# Patient Record
Sex: FEMALE | Race: BLACK OR AFRICAN AMERICAN | NOT HISPANIC OR LATINO | Employment: UNEMPLOYED | URBAN - METROPOLITAN AREA
[De-identification: names, ages, dates, MRNs, and addresses within clinical notes are randomized per-mention and may not be internally consistent; named-entity substitution may affect disease eponyms.]

---
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
## 2018-06-22 ENCOUNTER — OFFICE VISIT (OUTPATIENT)
Dept: OPHTHALMOLOGY | Facility: CLINIC | Age: 18
End: 2018-06-22
Payer: COMMERCIAL

## 2018-06-22 DIAGNOSIS — H02.125 MECHANICAL ECTROPION OF LOWER EYELIDS OF BOTH EYES: ICD-10-CM

## 2018-06-22 DIAGNOSIS — H52.13 MYOPIA OF BOTH EYES: Primary | ICD-10-CM

## 2018-06-22 DIAGNOSIS — H02.122 MECHANICAL ECTROPION OF LOWER EYELIDS OF BOTH EYES: ICD-10-CM

## 2018-06-22 ASSESSMENT — TONOMETRY
OD_IOP_MMHG: 15
OS_IOP_MMHG: 12
IOP_METHOD: ICARE

## 2018-06-22 ASSESSMENT — CONF VISUAL FIELD
OS_NORMAL: 1
OD_NORMAL: 1
METHOD: COUNTING FINGERS

## 2018-06-22 ASSESSMENT — VISUAL ACUITY
METHOD: SNELLEN - LINEAR
OS_PH_SC: 20/30+2
OD_SC: 20/70
OS_SC: 20/40
OS_SC+: -2
OD_SC+: -
OD_PH_SC: 20/30

## 2018-06-22 ASSESSMENT — REFRACTION_MANIFEST
OD_AXIS: 087
OS_CYLINDER: +0.50
OD_CYLINDER: +0.75
OS_AXIS: 087
OS_SPHERE: -1.50
OD_SPHERE: -2.00

## 2018-06-22 ASSESSMENT — EXTERNAL EXAM - RIGHT EYE: OD_EXAM: NORMAL

## 2018-06-22 ASSESSMENT — EXTERNAL EXAM - LEFT EYE: OS_EXAM: NORMAL

## 2018-06-22 ASSESSMENT — CUP TO DISC RATIO
OD_RATIO: 0.5
OS_RATIO: 0.5

## 2018-06-22 NOTE — MR AVS SNAPSHOT
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      Who to contact     Please call your clinic at [phone] to:    Ask questions about your health    Make or cancel appointments    Discuss your medicines    Learn about your test results    Speak to your doctor            Additional Information About Your Visit        MyChart Information     Dizzion is an electronic gateway that provides easy, online access to your medical records. With Dizzion, you can request a clinic appointment, read your test results, renew a prescription or communicate with your care team.     To sign up for [facility] visit the website at [email or web address]   You will be asked to enter the access code listed below, as well as some personal information. Please follow the directions to create your username and password.     Your access code is: [email or web address]  Expires: 2018  6:31 AM     Your access code will  in 90 days. If you need help or a new code, please contact your [facility] or call [phone] for assistance.        Care EveryWhere ID     This is your Care EveryWhere ID. This could be used by other organizations to access your [place] medical records  [id number]         Blood Pressure from Last 3 Encounters:   No data found for BP    Weight from Last 3 Encounters:   No data found for Wt              We Performed the Following     REFRACTION [[id number]]        Primary Care Provider Office Phone # Fax #     Clinic [facility] [phone]       [address] [facility] [address]        Equal Access to Services     HELEN JARA : Hadii aad ku hadmikaylajeff Mohini, homeroda parishportilloha, martha kajonda evelyn, sarah black laminnabrii marcela. So [facility] [phone].    ATENCIÓN: Si habla español, tiene a knutson disposición servicios gratuitos de asistencia lingüística. Llame al [phone].    We comply with applicable federal civil rights laws and [place] laws. We do not discriminate on the basis of race, color, national origin, age, disability, sex, sexual orientation, or gender identity.            Thank you!     Thank you for choosing [facility]  for your care. Our goal is always to provide you with excellent care. Hearing back from our patients is one way we can continue to improve our services. Please take a few minutes to complete the written survey that you may receive in the mail after your visit with us. Thank you!             Your Updated Medication List - Protect others around you: Learn how to safely use, store and throw away your medicines at www.disposemymeds.org.      Notice  As of 6/22/2018 11:59 PM    You have not been prescribed any medications.

## 2018-06-22 NOTE — PROGRESS NOTES
Assessment/Plan  (H52.13) Myopia of both eyes  (primary encounter diagnosis)  Comment: Myopia OU  Plan: REFRACTION [[id number]]         Educated patient on condition and clinical findings. Dispensed spectacle prescription for full time wear. Educated patient on possibility of adaptation period, if symptoms do not improve return to clinic for further testing.    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 Monitor annually.    Return to clinic in 1 year for comprehensive eye exam.    Complete documentation of historical and exam elements from today's encounter can  be found in the full encounter summary report (not reduplicated in this progress  note). I personally obtained the chief complaint(s) and history of present illness. I  confirmed and edited as necessary the review of systems, past medical/surgical  history, family history, social history, and examination findings as documented by  others; and I examined the patient myself. I personally reviewed the relevant tests,  images, and reports as documented above. I formulated and edited as necessary the  assessment and plan and discussed the findings and management plan with the  patient and family.    Filiberto Ruiz, OD, FAAO

## 2018-06-22 NOTE — NURSING NOTE
Chief Complaints and History of Present Illnesses   Patient presents with     Annual Eye Exam     HPI    Affected eye(s):  Both   Symptoms:     Blurred vision   No difficulty with reading   Redness      Frequency:  Constant       Do you have eye pain now?:  No      Comments:  Vision is blurry at distance. Had glasses in the past, lost 3 months ago. Had some pain and tears with glasses. Sometimes having pain and redness that comes and goes. Has uesd allergy eye drops in the past but not for 3 years.    Anju Forman COT 1:59 PM June 22, 2018

## 2023-04-04 ENCOUNTER — PRENATAL OFFICE VISIT (OUTPATIENT)
Dept: NURSING | Facility: CLINIC | Age: 23
End: 2023-04-04

## 2023-04-04 VITALS — HEIGHT: 65 IN

## 2023-04-04 DIAGNOSIS — Z34.90 ENCOUNTER FOR SUPERVISION OF NORMAL PREGNANCY: ICD-10-CM

## 2023-04-04 DIAGNOSIS — Z23 NEED FOR TDAP VACCINATION: ICD-10-CM

## 2023-04-04 DIAGNOSIS — Z98.891 HISTORY OF CESAREAN SECTION: ICD-10-CM

## 2023-04-04 PROBLEM — F32.9 MAJOR DEPRESSIVE DISORDER, SINGLE EPISODE: Status: ACTIVE | Noted: 2018-01-03

## 2023-04-04 PROBLEM — Z86.2 HISTORY OF THROMBOCYTOPENIA: Status: ACTIVE | Noted: 2022-10-19

## 2023-04-04 PROBLEM — D25.9 UTERINE LEIOMYOMA: Status: ACTIVE | Noted: 2022-10-19

## 2023-04-04 PROBLEM — Z87.59: Status: ACTIVE | Noted: 2022-10-19

## 2023-04-04 PROCEDURE — 99207 PR NO CHARGE NURSE ONLY: CPT

## 2023-04-04 RX ORDER — CETIRIZINE HYDROCHLORIDE 10 MG/1
TABLET ORAL
COMMUNITY
End: 2023-04-04

## 2023-04-04 RX ORDER — AMOXICILLIN 250 MG
CAPSULE ORAL
COMMUNITY
Start: 2022-10-22 | End: 2023-04-04

## 2023-04-04 RX ORDER — DIPHENHYDRAMINE HCL 25 MG
CAPSULE ORAL
COMMUNITY
End: 2023-04-04

## 2023-04-04 RX ORDER — B-COMPLEX WITH VITAMIN C
1 TABLET ORAL
COMMUNITY
Start: 2022-04-12 | End: 2023-04-04

## 2023-04-04 RX ORDER — VALACYCLOVIR HYDROCHLORIDE 500 MG/1
TABLET, FILM COATED ORAL
COMMUNITY
Start: 2022-10-22 | End: 2023-04-04

## 2023-04-04 RX ORDER — POLYETHYLENE GLYCOL 3350 17 G/17G
17 POWDER, FOR SOLUTION ORAL
COMMUNITY
Start: 2022-10-22 | End: 2023-04-04

## 2023-04-04 RX ORDER — ONDANSETRON 4 MG/1
TABLET, FILM COATED ORAL
COMMUNITY
End: 2023-04-04

## 2023-04-04 RX ORDER — MUPIROCIN 20 MG/G
OINTMENT TOPICAL
COMMUNITY
End: 2023-04-04

## 2023-04-04 RX ORDER — ERGOCALCIFEROL 1.25 MG/1
CAPSULE ORAL
COMMUNITY
End: 2023-04-04

## 2023-04-04 RX ORDER — CYPROHEPTADINE HYDROCHLORIDE 4 MG/1
TABLET ORAL
COMMUNITY
End: 2023-04-04

## 2023-04-04 RX ORDER — ACETAMINOPHEN 500 MG
TABLET ORAL
COMMUNITY
Start: 2022-11-07 | End: 2023-07-11

## 2023-04-04 RX ORDER — LORATADINE 10 MG/1
TABLET ORAL
COMMUNITY
End: 2023-04-04

## 2023-04-04 RX ORDER — FLUTICASONE PROPIONATE 50 MCG
SPRAY, SUSPENSION (ML) NASAL
COMMUNITY
End: 2023-04-04

## 2023-04-04 NOTE — PROGRESS NOTES
Important Information for Provider:     New ob nurse intake by phone, second pregnancy. History of C section 10/19/2022, post dates( [facility]). Patient had only 1 period after previous delivery, LMP 1/10/2023. Heavy period with mucous. Positive pregnancy test 2/10/2023.. ultrasound scheduled for 4/13/2023 with CNM to call with results. NOB with CNM 4/18/2023      Prenatal OB Questionnaire  Patient supplied answers from flow sheet for:  Prenatal OB Questionnaire.  Past Medical History  Have you ever received care for your mental health? : No  Have you ever been in a major accident or suffered serious trauma?: No  Within the last year, has anyone hit, slapped, kicked or otherwise hurt you?: No  In the last year, has anyone forced you to have sex when you didn't want to?: No    Past Medical History 2   Have you ever received a blood transfusion?: No  Would you accept a blood transfusion if was medically recommended?: Yes  Does anyone in your home smoke?: No   Is your blood type Rh negative?: Unknown  Have you ever ?: (!) Yes  Have you been hospitalized for a nonsurgical reason excluding normal delivery?: No  Have you ever had an abnormal pap smear?: No    Past Medical History (Continued)  Do you have a history of abnormalities of the uterus?: No  Did your mother take EMILE or any other hormones when she was pregnant with you?: No  Do you have any other problems we have not asked about which you feel may be important to this pregnancy?: No                     Allergies as of 4/4/2023:    Allergies as of 04/04/2023     (No Known Allergies)       Current medications are:  Current Outpatient Medications   Medication Sig Dispense Refill     acetaminophen (TYLENOL) 500 MG tablet acetaminophen 500 mg tablet (Patient not taking: Reported on 4/4/2023)           Early ultrasound screening tool:    Does patient have irregular periods?  No  Did patient use hormonal birth control in the three months prior to positive urine  pregnancy test? No  Is the patient breastfeeding?  No  Is the patient 10 weeks or greater at time of education visit?  Yes

## 2023-04-13 ENCOUNTER — VIRTUAL VISIT (OUTPATIENT)
Dept: MIDWIFE SERVICES | Facility: CLINIC | Age: 23
End: 2023-04-13
Payer: COMMERCIAL

## 2023-04-13 ENCOUNTER — HOSPITAL ENCOUNTER (OUTPATIENT)
Dept: ULTRASOUND IMAGING | Facility: CLINIC | Age: 23
Discharge: HOME | End: 2023-04-13
Attending: OBSTETRICS & GYNECOLOGY | Admitting: OBSTETRICS & GYNECOLOGY
Payer: COMMERCIAL

## 2023-04-13 DIAGNOSIS — Z34.90 ENCOUNTER FOR SUPERVISION OF NORMAL PREGNANCY: ICD-10-CM

## 2023-04-13 DIAGNOSIS — Z34.90 INTRAUTERINE PREGNANCY: Primary | ICD-10-CM

## 2023-04-13 PROCEDURE — 76801 OB US < 14 WKS SINGLE FETUS: CPT | Mod: 26 | Performed by: RADIOLOGY

## 2023-04-13 PROCEDURE — 76801 OB US < 14 WKS SINGLE FETUS: CPT

## 2023-04-13 PROCEDURE — 99207 PR NO BILLABLE SERVICE THIS VISIT: CPT | Mod: 93 | Performed by: ADVANCED PRACTICE MIDWIFE

## 2023-04-13 NOTE — PROGRESS NOTES
Phone-Visit Details    Type of service:  Phone Visit    Originating Location (pt. Location): Home    Distant Location (provider location): On-site    Mode of Communication: Telephone visit    Telephone time: 1217    Telephone call to [facility] [place] to discuss ultrasound results. LMP is 1/10/23 which aligns with NICOLETTE of 10/17/23. Ultrasound from 4/13/23 shows gage viable IUP dated 13w2d which aligns with LMP dating. Plan to keep due date as 10/17/23. RTC for NOB visit.    6 minutes spent on phone with patient.    CAMACHO Manning CNM

## 2023-05-08 LAB
ABO/RH(D): NORMAL
ANTIBODY SCREEN: NEGATIVE
SPECIMEN EXPIRATION DATE: NORMAL

## 2023-05-09 ENCOUNTER — PRENATAL OFFICE VISIT (OUTPATIENT)
Dept: MIDWIFE SERVICES | Facility: CLINIC | Age: 23
End: 2023-05-09
Payer: COMMERCIAL

## 2023-05-09 ENCOUNTER — TRANSCRIBE ORDERS (OUTPATIENT)
Dept: MATERNAL FETAL MEDICINE | Facility: CLINIC | Age: 23
End: 2023-05-09

## 2023-05-09 VITALS
BODY MASS INDEX: 24.57 KG/M2 | HEIGHT: 65 IN | HEART RATE: 84 BPM | WEIGHT: 147.5 LBS | DIASTOLIC BLOOD PRESSURE: 75 MMHG | SYSTOLIC BLOOD PRESSURE: 121 MMHG

## 2023-05-09 DIAGNOSIS — Z3A.17 17 WEEKS GESTATION OF PREGNANCY: ICD-10-CM

## 2023-05-09 DIAGNOSIS — Z98.891 HISTORY OF C-SECTION: ICD-10-CM

## 2023-05-09 DIAGNOSIS — O09.892 SHORT INTERVAL BETWEEN PREGNANCIES AFFECTING PREGNANCY IN SECOND TRIMESTER, ANTEPARTUM: ICD-10-CM

## 2023-05-09 DIAGNOSIS — O26.90 PREGNANCY RELATED CONDITION, ANTEPARTUM: Primary | ICD-10-CM

## 2023-05-09 DIAGNOSIS — O09.92 HIGH-RISK PREGNANCY, SECOND TRIMESTER: Primary | ICD-10-CM

## 2023-05-09 LAB
ALBUMIN UR-MCNC: NEGATIVE MG/DL
APPEARANCE UR: CLEAR
BILIRUB UR QL STRIP: NEGATIVE
COLOR UR AUTO: YELLOW
ERYTHROCYTE [DISTWIDTH] IN BLOOD BY AUTOMATED COUNT: 14.3 % (ref 10–15)
GLUCOSE UR STRIP-MCNC: NEGATIVE MG/DL
HCT VFR BLD AUTO: 34.7 % (ref 35–47)
HGB BLD-MCNC: 11.8 G/DL (ref 11.7–15.7)
HGB UR QL STRIP: NEGATIVE
KETONES UR STRIP-MCNC: NEGATIVE MG/DL
LEUKOCYTE ESTERASE UR QL STRIP: NEGATIVE
MCH RBC QN AUTO: 29.7 PG (ref 26.5–33)
MCHC RBC AUTO-ENTMCNC: 34 G/DL (ref 31.5–36.5)
MCV RBC AUTO: 87 FL (ref 78–100)
NITRATE UR QL: NEGATIVE
PH UR STRIP: 6.5 [PH] (ref 5–7)
PLATELET # BLD AUTO: 160 10E3/UL (ref 150–450)
RBC # BLD AUTO: 3.97 10E6/UL (ref 3.8–5.2)
SP GR UR STRIP: <=1.005 (ref 1–1.03)
UROBILINOGEN UR STRIP-ACNC: 0.2 E.U./DL
WBC # BLD AUTO: 7.6 10E3/UL (ref 4–11)

## 2023-05-09 PROCEDURE — 87086 URINE CULTURE/COLONY COUNT: CPT | Performed by: ADVANCED PRACTICE MIDWIFE

## 2023-05-09 PROCEDURE — 81003 URINALYSIS AUTO W/O SCOPE: CPT | Performed by: ADVANCED PRACTICE MIDWIFE

## 2023-05-09 PROCEDURE — 85027 COMPLETE CBC AUTOMATED: CPT | Performed by: ADVANCED PRACTICE MIDWIFE

## 2023-05-09 PROCEDURE — 87340 HEPATITIS B SURFACE AG IA: CPT | Performed by: ADVANCED PRACTICE MIDWIFE

## 2023-05-09 PROCEDURE — 99207 PR FIRST OB VISIT: CPT | Performed by: ADVANCED PRACTICE MIDWIFE

## 2023-05-09 PROCEDURE — 36415 COLL VENOUS BLD VENIPUNCTURE: CPT | Performed by: ADVANCED PRACTICE MIDWIFE

## 2023-05-09 PROCEDURE — 86901 BLOOD TYPING SEROLOGIC RH(D): CPT | Performed by: ADVANCED PRACTICE MIDWIFE

## 2023-05-09 PROCEDURE — 86850 RBC ANTIBODY SCREEN: CPT | Performed by: ADVANCED PRACTICE MIDWIFE

## 2023-05-09 PROCEDURE — 86762 RUBELLA ANTIBODY: CPT | Performed by: ADVANCED PRACTICE MIDWIFE

## 2023-05-09 PROCEDURE — 86803 HEPATITIS C AB TEST: CPT | Performed by: ADVANCED PRACTICE MIDWIFE

## 2023-05-09 PROCEDURE — 86780 TREPONEMA PALLIDUM: CPT | Performed by: ADVANCED PRACTICE MIDWIFE

## 2023-05-09 PROCEDURE — 87389 HIV-1 AG W/HIV-1&-2 AB AG IA: CPT | Performed by: ADVANCED PRACTICE MIDWIFE

## 2023-05-09 PROCEDURE — 86900 BLOOD TYPING SEROLOGIC ABO: CPT | Performed by: ADVANCED PRACTICE MIDWIFE

## 2023-05-09 RX ORDER — PRENATAL VIT/IRON FUM/FOLIC AC 27MG-0.8MG
1 TABLET ORAL DAILY
Qty: 90 TABLET | Refills: 3 | Status: SHIPPED | OUTPATIENT
Start: 2023-05-09 | End: 2023-10-25

## 2023-05-09 NOTE — PROGRESS NOTES
17w0d   Byron Vásquez is a 23 year old  who presents to the clinic for an new ob visit. She was a previous CNM patient of the [facility] midwives. She had a C/S at [facility] on 10/19/22 due to fetal intolerance of labor. She had decelerations noted on PD testing, and attempted induction of labor. Fetus did not tolerate, so C/S was done. She was hoping for TOLAC for this pregnancy, but pregnancies are too closely spaced. No complications with previous pregnancy other than C/S. She has had 1 covid-19 vaccine, is interested in another. Had NIL PAP postpartum at [facility] in 2022.  Estimated Date of Delivery: Oct 17, 2023 is calculated from Patient's last menstrual period was 01/10/2023.     She has not had bleeding since her LMP.   She has had mild nausea. Weigh loss has not occurred.   This was not a planned pregnancy, was taking OCPs.   FOB is involved,  Stefan   OTHER CONCERNS: C/S in Oct, 2022    INFECTION HISTORY  HIV: no  Hepatitis B: no  Hepatitis C: no  Syphilis:  no  Tuberculosis: no   PPD- no  Herpes self: no  Herpes partner:  no  Chlamydia:  no  Gonorrhea:  no  HPV: no  BV:  no  Trichomonis:  no  Chicken Pox:  NO  ====================================================  GENETIC SCREENING  Genetic screening reviewed. High Risk? No  ====================================================  PERSONAL/SOCIAL HISTORY  Lives lives with their family.  Employment: Unemployed.    HX OF ABUSE: no  =====================================================   REVIEW OF SYSTEMS  CONSTITUTIONAL: NEGATIVE for fever, chills  EYES: NEGATIVE for vision changes   RESP: NEGATIVE for significant cough or SOB  CV: NEGATIVE for chest pain, palpitations   GI: NEGATIVE for nausea, abdominal pain, heartburn, or change in bowel habits  GI: NEGATIVE for nausea, abdominal pain, heartburn and POSITIVE for constipation  : NEGATIVE for frequency, dysuria, or hematuria  MUSCULOSKELETAL: NEGATIVE for significant arthralgias or myalgia  NEURO: NEGATIVE  "for weakness, dizziness or paresthesias or headache  PSYCHIATRIC: NEGATIVE for changes in mood or affect  ====================================================    PHYSICAL EXAM:  /75 (BP Location: Left arm, Patient Position: Sitting, Cuff Size: Adult Regular)   Pulse 84   Ht 1.651 m (5' 5\")   Wt 66.9 kg (147 lb 8 oz)   LMP 01/10/2023   BMI 24.55 kg/m    BMI- Body mass index is 24.55 kg/m .,     RECOMMENDED WEIGHT GAIN: 25-35 lbs.  PHQ9- Today's Depression Rating was No Value exists for the : HP#PHQ9  GENERAL:  Pleasant pregnant female, alert, well groomed.   SKIN:  Warm and dry, without lesions or rashes  HEAD: Symmetrical features.  NECK:  Thyroid without enlargement and nodules.  Lymph nodes not palpable.   LUNGS:  Clear to auscultation.  HEART:  RRR without murmur.  ABDOMEN: Soft without masses , tenderness or organomegaly.  No CVA tenderness. Well healed scar from  section     MUSCULOSKELETAL:  Full range of motion  EXTREMITIES:  No edema. No significant varicosities.   =========================================  ASSESSMENT:  17w0d,   (O09.92) High-risk pregnancy, second trimester  (primary encounter diagnosis)  Plan: Prenatal Vit-Fe Fumarate-FA (PRENATAL         MULTIVITAMIN W/IRON) 27-0.8 MG tablet, Mat         Fetal Med Ctr Referral - Pregnancy, US OB > 14         Weeks    (Z3A.17) 17 weeks gestation of pregnancy    (Z98.891) History of     (O09.892) Short interval between pregnancies affecting pregnancy in second trimester, antepartum  Recommend MD care for this pregnancy. Is not a candidate for TOLAC due to recent C/S delivery in Oct, 2022. Discussed with patient.    PREGNANCY AT RISK? Yes-close pregnancy interval  ==========================================  PLAN:  Instructed on use of triage nurse line and contacting the on call CNM after hours for an urgent need such as fever, vagina bleeding, bladder or vaginal infection, rupture of membranes,  or term " labor.    Discussed the indications, uses for and false positives for quad screen, nuchal translucency and fetal survey ultrasound at 18-20 weeks gestation. M order placed.  Instructed on best evidence for: weight gain for her BMI for pregnancy; healthy diet and foods to avoid; exercise and activity during pregnancy;avoiding exposure to toxoplasmosis; and maintenance of a generally healthy lifestyle.   Discussed the harms, benefits, side effects and alternative therapies for current prescribed and OTC medications.  Return to care 2-3 weeks for fetal survey and MD appt.    Cem Carlos CNM

## 2023-05-10 LAB
HBV SURFACE AG SERPL QL IA: NONREACTIVE
HCV AB SERPL QL IA: NONREACTIVE
HIV 1+2 AB+HIV1 P24 AG SERPL QL IA: NONREACTIVE
RUBV IGG SERPL QL IA: 3.27 INDEX
RUBV IGG SERPL QL IA: POSITIVE
T PALLIDUM AB SER QL: NONREACTIVE

## 2023-05-11 LAB — BACTERIA UR CULT: NORMAL

## 2023-05-17 ENCOUNTER — PRE VISIT (OUTPATIENT)
Dept: MATERNAL FETAL MEDICINE | Facility: CLINIC | Age: 23
End: 2023-05-17

## 2023-06-07 ENCOUNTER — ANCILLARY PROCEDURE (OUTPATIENT)
Dept: ULTRASOUND IMAGING | Facility: CLINIC | Age: 23
End: 2023-06-07
Attending: ADVANCED PRACTICE MIDWIFE
Payer: COMMERCIAL

## 2023-06-07 DIAGNOSIS — O09.92 HIGH-RISK PREGNANCY, SECOND TRIMESTER: ICD-10-CM

## 2023-06-07 PROCEDURE — 76805 OB US >/= 14 WKS SNGL FETUS: CPT | Performed by: OBSTETRICS & GYNECOLOGY

## 2023-07-11 ENCOUNTER — PRENATAL OFFICE VISIT (OUTPATIENT)
Dept: OBGYN | Facility: CLINIC | Age: 23
End: 2023-07-11
Payer: COMMERCIAL

## 2023-07-11 VITALS
SYSTOLIC BLOOD PRESSURE: 102 MMHG | OXYGEN SATURATION: 99 % | WEIGHT: 154.6 LBS | BODY MASS INDEX: 25.73 KG/M2 | HEART RATE: 73 BPM | DIASTOLIC BLOOD PRESSURE: 66 MMHG

## 2023-07-11 DIAGNOSIS — O09.892 SHORT INTERVAL BETWEEN PREGNANCIES AFFECTING PREGNANCY IN SECOND TRIMESTER, ANTEPARTUM: ICD-10-CM

## 2023-07-11 DIAGNOSIS — O34.219 HISTORY OF CESAREAN DELIVERY, CURRENTLY PREGNANT: ICD-10-CM

## 2023-07-11 DIAGNOSIS — Z34.82 ENCOUNTER FOR SUPERVISION OF OTHER NORMAL PREGNANCY IN SECOND TRIMESTER: Primary | ICD-10-CM

## 2023-07-11 LAB
GLUCOSE 1H P 50 G GLC PO SERPL-MCNC: 86 MG/DL (ref 70–129)
HGB BLD-MCNC: 12 G/DL (ref 11.7–15.7)
HOLD SPECIMEN: NORMAL

## 2023-07-11 PROCEDURE — 36415 COLL VENOUS BLD VENIPUNCTURE: CPT | Performed by: OBSTETRICS & GYNECOLOGY

## 2023-07-11 PROCEDURE — 99207 PR PRENATAL VISIT: CPT | Performed by: OBSTETRICS & GYNECOLOGY

## 2023-07-11 PROCEDURE — 82950 GLUCOSE TEST: CPT | Performed by: OBSTETRICS & GYNECOLOGY

## 2023-07-11 ASSESSMENT — PATIENT HEALTH QUESTIONNAIRE - PHQ9
5. POOR APPETITE OR OVEREATING: NOT AT ALL
SUM OF ALL RESPONSES TO PHQ QUESTIONS 1-9: 0

## 2023-07-11 ASSESSMENT — ANXIETY QUESTIONNAIRES
1. FEELING NERVOUS, ANXIOUS, OR ON EDGE: NOT AT ALL
IF YOU CHECKED OFF ANY PROBLEMS ON THIS QUESTIONNAIRE, HOW DIFFICULT HAVE THESE PROBLEMS MADE IT FOR YOU TO DO YOUR WORK, TAKE CARE OF THINGS AT HOME, OR GET ALONG WITH OTHER PEOPLE: NOT DIFFICULT AT ALL
6. BECOMING EASILY ANNOYED OR IRRITABLE: NOT AT ALL
5. BEING SO RESTLESS THAT IT IS HARD TO SIT STILL: NOT AT ALL
3. WORRYING TOO MUCH ABOUT DIFFERENT THINGS: NOT AT ALL
2. NOT BEING ABLE TO STOP OR CONTROL WORRYING: NOT AT ALL

## 2023-07-11 NOTE — PATIENT INSTRUCTIONS
Blood Glucose Screening During Pregnancy  Gestational diabetes is diabetes that only pregnant women get. Changes in your body during pregnancy can cause high blood sugar (glucose). This can cause problems for you and your baby. It is a serious condition. But it can be controlled.      Your healthcare provider will talk with you about blood glucose screening.     Who is at risk for gestational diabetes?  You are at risk of getting gestational diabetes if any of the risk factors below apply to you. The risk for this condition gets higher as your number of risk factors increases:     You are , , , , or .    You weigh more than your healthcare provider says is healthy for you.    You have a relative with diabetes.    You are older than 25.    You had gestational diabetes during a past pregnancy.    You had a stillbirth or a very large baby before.    You have a history of abnormal glucose tolerance.    You have sugar in your urine at the first prenatal visit.    You have metabolic syndrome, PCOS (polycystic ovary syndrome), are using glucocorticoids, or have high blood pressure.    You are pregnant with twins or more  What happens during a screening?  Here is what to expect during a blood glucose screening:    There is conflicting advice for screening. But the American College of Obstetricians and Gynecologists currently advises that all pregnant women be screened for gestational diabetes. When you are screened depends on your risk. Women are tested at 24 to 28 weeks of pregnancy. Women at high risk may be tested when they first learn they are pregnant.    To do the screening, a blood sample is taken. Your blood sugar level is measured.    If the results show a high blood sugar level, a glucose tolerance test may be ordered. You will drink a certain amount of sugar. This test measures how long it takes for sugar to leave your blood. The test will show if  you have gestational diabetes.  What to know if you test positive  Here are some things you need to know:    Gestational diabetes can be treated. The best way to control it is to find out you have it early and start treatment quickly.    This condition can cause problems for the mother during pregnancy. It can also cause problems with the baby during pregnancy, delivery, and after. Treatment greatly lowers the chance for problems.    The changes in your body that cause gestational diabetes normally happen only when you are pregnant. After the baby is born, your body goes back to normal. The condition goes away. But you may be more likely to have type 2 diabetes later. Talk with your healthcare provider about ways to help prevent type 2 diabetes.  Treating gestational diabetes  Here is how to treat gestational diabetes:    You ll need to check your blood sugar often. You can do this at home. Prick your finger and check a drop of blood on a glucose monitor. Your healthcare provider will show you how and when to check your blood sugar. They will talk about your target blood sugar level.    To manage your blood sugar, you will be given a special plan. It will likely include meal planning and getting regular exercise. Some women need to take a hormone called insulin. Others may take medicine to help control their blood sugar.  HelpMeNow last reviewed this educational content on 12/1/2022 2000-2023 [employer] StayWell [employer]. All rights reserved. This information is not intended as a substitute for professional medical care. Always follow your healthcare professional's instructions.          Tdap (Tetanus, Diphtheria, Pertussis) Vaccine: What You Need to Know    This is a Vaccine Information Statement from the CDC.   Many vaccine information statements are available in [place] and other languages. See www.immunize.org/vis   Hojas de información sobre vacunas están disponibles en español y en muchos otros idiomas. Visite  www.immunize.org/vis   1. Why get vaccinated?   Tdap vaccine can prevent tetanus, diphtheria, and pertussis.   Diphtheria and pertussis spread from person to person. Tetanus enters the body through cuts or wounds.     TETANUS (T) causes painful stiffening of the muscles. Tetanus can lead to serious health problems, including being unable to open the mouth, having trouble swallowing and breathing, or death.    DIPHTHERIA (D) can lead to difficulty breathing, heart failure, paralysis, or death.    PERTUSSIS (aP), also known as  whooping cough,  can cause uncontrollable, violent coughing that makes it hard to breathe, eat, or drink. Pertussis can be extremely serious especially in babies and young children, causing pneumonia, convulsions, brain damage, or death. In teens and adults, it can cause weight loss, loss of bladder control, passing out, and rib fractures from severe coughing.  2. Tdap vaccine   Tdap is only for children 7 years and older, adolescents, and adults.   Adolescents should receive a single dose of Tdap, preferably at age 11 or 12 years.   Pregnant people should get a dose of Tdap during every pregnancy, preferably during the early part of the third trimester, to help protect the  from pertussis. Infants are most at risk for severe, life-threatening complications from pertussis.   Adults who have never received Tdap should get a dose of Tdap.   Also, adults should receive a booster dose of either Tdap or Td (a different vaccine that protects against tetanus and diphtheria but not pertussis) every 10 years , or after 5 years in the case of a severe or dirty wound or burn.   Tdap may be given at the same time as other vaccines.   3. Talk with your health care provider  Tell your vaccination provider if the person getting the vaccine:     Has had an allergic reaction after a previous dose of any vaccine that protects against tetanus, diphtheria, or pertussis , or has any severe, life-threatening  allergies    Has had a coma, decreased level of consciousness, or prolonged seizures within 7 days after a previous dose of any pertussis vaccine (DTP, DTaP, or Tdap)    Has seizures or another nervous system problem    Has ever had Guillain-Barré Syndrome (also called  GBS )    Has had severe pain or swelling after a previous dose of any vaccine that protects against tetanus or diphtheria  In some cases, your health care provider may decide to postpone Tdap vaccination until a future visit.   People with minor illnesses, such as a cold, may be vaccinated. People who are moderately or severely ill should usually wait until they recover before getting Tdap vaccine.   Your health care provider can give you more information.  4. Risks of a vaccine reaction     Pain, redness, or swelling where the shot was given, mild fever, headache, feeling tired, and nausea, vomiting, diarrhea, or stomachache sometimes happen after Tdap vaccination.  People sometimes faint after medical procedures, including vaccination. Tell your provider if you feel dizzy or have vision changes or ringing in the ears.   As with any medicine, there is a very remote chance of a vaccine causing a severe allergic reaction, other serious injury, or death.   5. What if there is a serious problem?  An allergic reaction could occur after the vaccinated person leaves the clinic. If you see signs of a severe allergic reaction (hives, swelling of the face and throat, difficulty breathing, a fast heartbeat, dizziness, or weakness), call 9-1-1 and get the person to the nearest hospital.   For other signs that concern you, call your health care provider.   Adverse reactions should be reported to the Vaccine Adverse Event Reporting System (VAERS). Your health care provider will usually file this report, or you can do it yourself. Visit the VAERS website at www.vaers.hhs.gov or call 1-985.230.2530. VAERS is only for reporting reactions, and VAERS staff members do  not give medical advice.   6. The National Vaccine Injury Compensation Program  The National Vaccine Injury Compensation Program (VICP) is a federal program that was created to compensate people who may have been injured by certain vaccines. Claims regarding alleged injury or death due to vaccination have a time limit for filing, which may be as short as two years. Visit the VICP website at www.hrsa.gov/vaccinecompensation or call 1-995.875.7128 to learn about the program and about filing a claim.   7. How can I learn more?     Ask your health care provider.    Call your local or state health department.    Visit the website of the Food and Drug Administration (FDA) for vaccine package inserts and additional information at www.fda.gov/vaccines-blood-biologics/vaccines.  ? Contact the Centers for Disease Control and Prevention (CDC): Call 1-822.839.3242 ( 3-825-LBP-INFO) or  ? Visit CDC s website at www.cdc.gov/vaccines.  Vaccine Information Statement   Tdap (Tetanus, Diphtheria, Pertussis) Vaccine  42 U.S.C.   300aa-26  8/6/2021  StayWell last reviewed this educational content on     8942-7307 The [employer]Well [employer], [employer]. All rights reserved. This information is not intended as a substitute for professional medical care. Always follow your healthcare professional's instructions.        You have been provided the My Labor and Birth Wishes document.  Please review at home and bring to your next prenatal visit. Bring this sheet to the hospital for your birth. Give copies to your care team members and support person.   Additional copies can be found here:  [email or web address]

## 2023-07-11 NOTE — PROGRESS NOTES
OB Consult for TOLAC  2023     Byron Vásquez is a 23 year old  at 26w0d who presents to assess mode of delivery.   She has had one previous low transverse  section and is interested in trial of labor after .     Her current pregnancy is uncomplicated and is progressing well.  Please refer to the antepartum records for further details.    Active fetal movement. No contractions. No leaking fluid, bleeding, abnormal discharge.     1h gtt 86  Hgb 12    Childbirth classes? NO  Plan on breastfeeding? Yes  Birthcontrol? Nexplanon  Sex on ultrasound? boy  Circumsion? Yes  Peds doc? [facility]      OB History    Para Term  AB Living   2 1 1 0 0 1   SAB IAB Ectopic Multiple Live Births   0 0 0 0 1      # Outcome Date GA Lbr Rell/2nd Weight Sex Delivery Anes PTL Lv   2 Current            1 Term 10/19/22 41w0d  3.019 kg (6 lb 10.5 oz) M -SEC  N WILTON       TOLAC Candidate Assessment  Prior c/s date 10/       Indication: category 2 or 3 tracing   Labor Course:   22 y.o..  @ 41w0d presented for routine post-dates testing. Initially fetal heart rate reassuring with moderate variability and spontaneous accelerations. However, had an unprovoked prolonged deceleration of fetal heart rate lasting 6 minutes with good recovery and reassuring tracing after. Was admitted for monitoring and delivery planning. Consideration given to oxytocin challenge test, however over the next 3 hours there were an additional 4 prolonged decelerations.  for non-reassuring fetal status remote from delivery was recommended and performed.    Operative note is available and has been reviewed in Care everywhere.    Incision: low transverse  Uterine closure:  double layer  The procedure and the post operative course were uncomplicated.         ASSESSMENT:   Byron Vásquez is a 23 year old  at 26w0d with history of previous low transverse  section x1 on 10/19/2022. Because  of close pregnancy interval I do not recommend TOLAC. I recommend delivery by repeat CS. Discussed we would schedule it at 39 weeks. Patient expresses understanding, questions answered.     PLAN:    Plan delivery by repeat  section    RTC for routine obstetrical care.     Oliva Rice MD

## 2023-08-09 ENCOUNTER — PRENATAL OFFICE VISIT (OUTPATIENT)
Dept: OBGYN | Facility: CLINIC | Age: 23
End: 2023-08-09
Payer: COMMERCIAL

## 2023-08-09 VITALS
DIASTOLIC BLOOD PRESSURE: 71 MMHG | BODY MASS INDEX: 26.81 KG/M2 | SYSTOLIC BLOOD PRESSURE: 106 MMHG | WEIGHT: 161.1 LBS | HEART RATE: 73 BPM | OXYGEN SATURATION: 99 %

## 2023-08-09 DIAGNOSIS — Z34.82 ENCOUNTER FOR SUPERVISION OF OTHER NORMAL PREGNANCY IN SECOND TRIMESTER: Primary | ICD-10-CM

## 2023-08-09 PROCEDURE — 99207 PR PRENATAL VISIT: CPT | Performed by: OBSTETRICS & GYNECOLOGY

## 2023-08-09 NOTE — PROGRESS NOTES
Doing well.   Had less fetal movement yesterday, normal today. She feels less movement this pregnancy than her last.   Discussed plan for  section. Hoping to have a longer inter-pregnancy interval and consider TOLAC next pregnancy.   Plan CS for 39 weeks, hoping to see me consistently as much as possible until then.  RTC 2 weeks.

## 2023-08-21 ENCOUNTER — PRENATAL OFFICE VISIT (OUTPATIENT)
Dept: OBGYN | Facility: CLINIC | Age: 23
End: 2023-08-21
Payer: COMMERCIAL

## 2023-08-21 VITALS
DIASTOLIC BLOOD PRESSURE: 70 MMHG | WEIGHT: 162 LBS | HEART RATE: 76 BPM | BODY MASS INDEX: 26.96 KG/M2 | SYSTOLIC BLOOD PRESSURE: 109 MMHG | TEMPERATURE: 97 F

## 2023-08-21 DIAGNOSIS — Z34.82 ENCOUNTER FOR SUPERVISION OF OTHER NORMAL PREGNANCY IN SECOND TRIMESTER: Primary | ICD-10-CM

## 2023-08-21 DIAGNOSIS — Z98.891 HISTORY OF CESAREAN DELIVERY: ICD-10-CM

## 2023-08-21 DIAGNOSIS — R42 DIZZINESS: ICD-10-CM

## 2023-08-21 PROCEDURE — 99207 PR PRENATAL VISIT: CPT | Performed by: OBSTETRICS & GYNECOLOGY

## 2023-08-21 NOTE — PROGRESS NOTES
Doing well.   Good fetal movement.   Occ dizziness, especially when standing. Has to sit down and rest. Discussed compression stockings and precautions.   Discussed RCS at 39 weeks  RTC 2 weeks

## 2023-08-21 NOTE — PROGRESS NOTES
Phone-Visit Details    Type of service:  Phone Visit    Originating Location (pt. Location): Home    Distant Location (provider location): On-site    Mode of Communication: Telephone visit    Telephone time: ***    CC:     HPI:     O:     Assessment and Plan:   Byron Vásquez is a 23 year old  who presents with ***   No diagnosis found.      Dispo: ***

## 2023-08-25 ENCOUNTER — TELEPHONE (OUTPATIENT)
Dept: OBGYN | Facility: CLINIC | Age: 23
End: 2023-08-25

## 2023-08-30 ENCOUNTER — TELEPHONE (OUTPATIENT)
Dept: OBGYN | Facility: CLINIC | Age: 23
End: 2023-08-30

## 2023-08-30 DIAGNOSIS — J06.9 UPPER RESPIRATORY TRACT INFECTION, UNSPECIFIED TYPE: ICD-10-CM

## 2023-08-30 DIAGNOSIS — R05.1 ACUTE COUGH: ICD-10-CM

## 2023-08-30 DIAGNOSIS — Z91.89 AT INCREASED RISK OF EXPOSURE TO COVID-19 VIRUS: Primary | ICD-10-CM

## 2023-08-30 DIAGNOSIS — R51.9 NONINTRACTABLE HEADACHE, UNSPECIFIED CHRONICITY PATTERN, UNSPECIFIED HEADACHE TYPE: ICD-10-CM

## 2023-08-30 NOTE — TELEPHONE ENCOUNTER
33w1d    Has a headache, cough, no fever, tylenol is not helping right now, said she only took 500 mg - started last Friday  Doesn't have at home covid tests  No one in the house is sick    She says she isnt drinking enough water    Advised patient to take 1000 mg of tylenol every 6 hours and push fluids as much as possible. And to let us know if she isnt starting to feel better in a few days.     Will route to provider to see if patient should be tested for COVID/flu/rsv. Symptom onset is already 5 days ago. Order pended.     Next apt is 23    ALPA Ignacio

## 2023-08-30 NOTE — TELEPHONE ENCOUNTER
Type of surgery: ob  Location of surgery: [facility]  Date and time of surgery: 10/10/2023 10:30AM  Surgeon: Dr. Priscila Nichols  Pre-Op Appt Date: surgeon  Post-Op Appt Date: 6 weeks   Packet sent out: Yes  Pre-cert/Authorization completed:  Not Applicable  Date: 09/30/2023

## 2023-08-31 NOTE — TELEPHONE ENCOUNTER
Called patient to inform her of lab orders. No answer, LVM. Also sent [employer]hart message.    ALPA Ignacio

## 2023-08-31 NOTE — TELEPHONE ENCOUNTER
Was unable to sign pended test that had COVID/flu/RSV together, but was able to put in orders for each test individually.  Should be masking for now, given increased COVID cases in the community.    Oliva Astudillo MD

## 2023-09-08 ENCOUNTER — PRENATAL OFFICE VISIT (OUTPATIENT)
Dept: OBGYN | Facility: CLINIC | Age: 23
End: 2023-09-08
Payer: COMMERCIAL

## 2023-09-08 VITALS
SYSTOLIC BLOOD PRESSURE: 106 MMHG | WEIGHT: 165 LBS | BODY MASS INDEX: 27.49 KG/M2 | DIASTOLIC BLOOD PRESSURE: 73 MMHG | HEIGHT: 65 IN | HEART RATE: 99 BPM | OXYGEN SATURATION: 98 %

## 2023-09-08 DIAGNOSIS — J06.9 UPPER RESPIRATORY TRACT INFECTION, UNSPECIFIED TYPE: ICD-10-CM

## 2023-09-08 DIAGNOSIS — R51.9 NONINTRACTABLE HEADACHE, UNSPECIFIED CHRONICITY PATTERN, UNSPECIFIED HEADACHE TYPE: ICD-10-CM

## 2023-09-08 DIAGNOSIS — R05.1 ACUTE COUGH: Primary | ICD-10-CM

## 2023-09-08 DIAGNOSIS — Z91.89 AT INCREASED RISK OF EXPOSURE TO COVID-19 VIRUS: ICD-10-CM

## 2023-09-08 LAB
FLUAV RNA SPEC QL NAA+PROBE: NEGATIVE
FLUBV RNA RESP QL NAA+PROBE: NEGATIVE
RSV RNA SPEC NAA+PROBE: NEGATIVE
SARS-COV-2 RNA RESP QL NAA+PROBE: POSITIVE

## 2023-09-08 PROCEDURE — 87637 SARSCOV2&INF A&B&RSV AMP PRB: CPT | Performed by: OBSTETRICS & GYNECOLOGY

## 2023-09-08 PROCEDURE — 99207 PR PRENATAL VISIT: CPT | Performed by: OBSTETRICS & GYNECOLOGY

## 2023-09-08 NOTE — PROGRESS NOTES
Feels like she has been sick for the last 6 months.  Currently not feeling well today with fever and body aches.  Son with her and appears ill.  COVID and flu swabs done and discussed would do vaccinations at next visit when she is feeling better. BE

## 2023-09-19 NOTE — PATIENT INSTRUCTIONS
Weeks 34 to 36 of Your Pregnancy: Care Instructions  Your belly has grown quite large. It's almost time to give birth! Your baby's lungs are almost ready to breathe air. The skull bones are firm enough to protect your baby's head. But they're soft enough to move down through the birth canal.    You might be wondering what to expect during labor. Because each birth is different, there's no way to know exactly what childbirth will be like for you. Talk to your doctor or midwife about any concerns you have.   You'll probably have a test for group B streptococcus (GBS). GBS is bacteria that can live in the vagina and rectum. GBS can make your baby sick after birth. If you test positive, you'll get antibiotics during labor.     Choose what type of pain relief you would like during labor.  You can choose from a few types, including medicine and non-medicine options. You may want to use several types of pain relief.     Know how medicines can help with pain during labor.  Some medicines lower anxiety and help with some of the pain. Others make your lower body numb so that you will feel less pain.     Tell your doctor about your pain medicine choice.  Do this before you start labor or very early in your labor. You may be able to change your mind during labor.     Learn about the stages of labor.    The first stage includes the early (latent) and active phases of labor. Contractions start in early labor. During active labor, contractions get stronger, last longer, and happen more often. And the cervix opens more rapidly.  The second stage starts when you're ready to push. During this stage, your baby is born.  During the third stage, you'll usually have a few more contractions to push out the placenta.   Follow-up care is a key part of your treatment and safety. Be sure to make and go to all appointments, and call your doctor if you are having problems. It's also a good idea to know your test results and keep a list of the  "medicines you take.  Where can you learn more?  Go to https://www.[employer].net/patiented  Enter B912 in the search box to learn more about \"Weeks 34 to 36 of Your Pregnancy: Care Instructions.\"  Current as of: 2022               Content Version: 13.7    0007-5618 [employer].   Care instructions adapted under license by your healthcare professional. If you have questions about a medical condition or this instruction, always ask your healthcare professional. [employer] disclaims any warranty or liability for your use of this information.      Group B Strep During Pregnancy: Care Instructions  Overview     Group B strep infection is caused by a type of bacteria. It's a different kind of bacteria than the kind that causes strep throat.  You may have this kind of bacteria in your body. Sometimes it may cause an infection, but most of the time it doesn't make you sick or cause symptoms. But if you pass the bacteria to your baby during the birth, it can cause serious health problems for your baby.  If you have this bacteria in your body, you will get antibiotics when you are in labor. Antibiotics help prevent problems for a  baby.  After birth, doctors will watch and may test your baby. If your baby tests positive for Group B strep, your baby will get antibiotics.  If you plan to breastfeed your baby, don't worry. It will be safe to breastfeed.  Follow-up care is a key part of your treatment and safety. Be sure to make and go to all appointments, and call your doctor if you are having problems. It's also a good idea to know your test results and keep a list of the medicines you take.  How can you care for yourself at home?  If your doctor has prescribed antibiotics, take them as directed. Do not stop taking them just because you feel better. You need to take the full course of antibiotics.  Tell your doctor if you are allergic to any antibiotic.  If you go into labor, or " "your water breaks, go to the hospital. Your doctor will give you antibiotics to help protect your baby from infection.  Tell the doctors and nurses if you have an allergy to penicillin.  Tell the doctors and nurses at the hospital that you tested positive for group B strep.  When should you call for help?   Call your doctor now or seek immediate medical care if:    You have symptoms of a urinary tract infection. These may include:  Pain or burning when you urinate.  A frequent need to urinate without being able to pass much urine.  Pain in the flank, which is just below the rib cage and above the waist on either side of the back.  Blood in your urine.  A fever.     You think you are in labor or your water has broken.     You have pain in your belly or pelvis.   Watch closely for changes in your health, and be sure to contact your doctor if you have any problems.  Where can you learn more?  Go to https://www.[employer].MonkeyFind/patiented  Enter M001 in the search box to learn more about \"Group B Strep During Pregnancy: Care Instructions.\"  Current as of: October 31, 2022               Content Version: 13.7    0631-4714 [employer].   Care instructions adapted under license by your healthcare professional. If you have questions about a medical condition or this instruction, always ask your healthcare professional. [employer] disclaims any warranty or liability for your use of this information.      Circumcision in Infants: What to Expect at Home  Your Child's Recovery  After circumcision, your baby's penis may look red and swollen. It may have petroleum jelly and gauze on it. The gauze will likely come off when your baby urinates. Follow your doctor's directions about whether to put clean gauze back on your baby's penis or to leave the gauze off. If you need to remove gauze from the penis, use warm water to soak the gauze and gently loosen it.  The doctor may have used a Plastibell device to do " the circumcision. If so, your baby will have a plastic ring around the head of the penis. The ring should fall off by itself in 10 to 12 days.  A thin, yellow film may form over the area the day after the procedure. This is part of the normal healing process. It should go away in a few days.  Your baby may seem fussy while the area heals. It may hurt for your baby to urinate. This pain often gets better in 3 or 4 days. But it may last for up to 2 weeks.  Even though your baby's penis will likely start to feel better after 3 or 4 days, it may look worse. The penis often starts to look like it's getting better after about 7 to 10 days.  This care sheet gives you a general idea about how long it will take for your child to recover. But each child recovers at a different pace. Follow the steps below to help your child get better as quickly as possible.  How can you care for your child at home?  Activity    Let your baby rest as much as possible. Sleeping will help with recovery.     You can give your baby a sponge bath the day after surgery. Ask your doctor when it is okay to give your baby a bath.   Medicines    Your doctor will tell you if and when your child can restart any medicines. The doctor will also give you instructions about your child taking any new medicines.     Your doctor may recommend giving your baby acetaminophen (Tylenol) to help with pain after the procedure. Be safe with medicines. Give your child medicines exactly as prescribed. Call your doctor if you think your child is having a problem with a medicine.     Do not give your child two or more pain medicines at the same time unless the doctor told you to. Many pain medicines have acetaminophen, which is Tylenol. Too much acetaminophen (Tylenol) can be harmful.   Circumcision care    Always wash your hands before and after touching the circumcision area.     Gently wash your baby's penis with plain, warm water after each diaper change, and pat it  "dry. Do not use soap. Don't use hydrogen peroxide or alcohol. They can slow healing.     Do not try to remove the film that forms on the penis. The film will go away on its own.     Put plenty of petroleum jelly (such as Vaseline) on the circumcision area during each diaper change. This will prevent your baby's penis from sticking to the diaper while it heals.     Fasten your baby's diapers loosely so that there is less pressure on the penis while it heals.   Follow-up care is a key part of your child's treatment and safety. Be sure to make and go to all appointments, and call your doctor if your child is having problems. It's also a good idea to know your child's test results and keep a list of the medicines your child takes.  When should you call for help?   Call your doctor now or seek immediate medical care if:    Your baby has a fever over 100.4 F.     Your baby is extremely fussy or irritable, has a high-pitched cry, or refuses to eat.     Your baby does not have a wet diaper within 12 hours after the circumcision.     You find a spot of bleeding larger than a 2-inch [employer] from the incision.     Your baby has signs of infection. Signs may include severe swelling; redness; a red streak on the shaft of the penis; or a thick, yellow discharge.   Watch closely for changes in your child's health, and be sure to contact your doctor if:    A Plastibell device was used for the circumcision and the ring has not fallen off after 10 to 12 days.   Where can you learn more?  Go to https://www.HearToday.Org.net/patiented  Enter S255 in the search box to learn more about \"Circumcision in Infants: What to Expect at Home.\"  Current as of: March 1, 2023               Content Version: 13.7    1459-4511 [employer].   Care instructions adapted under license by your healthcare professional. If you have questions about a medical condition or this instruction, always ask your healthcare professional. [employer] disclaims any warranty or liability for your use of this information.

## 2023-09-25 ENCOUNTER — PRENATAL OFFICE VISIT (OUTPATIENT)
Dept: OBGYN | Facility: CLINIC | Age: 23
End: 2023-09-25
Payer: COMMERCIAL

## 2023-09-25 VITALS
OXYGEN SATURATION: 97 % | DIASTOLIC BLOOD PRESSURE: 73 MMHG | HEART RATE: 79 BPM | BODY MASS INDEX: 28.14 KG/M2 | SYSTOLIC BLOOD PRESSURE: 107 MMHG | WEIGHT: 169.1 LBS

## 2023-09-25 DIAGNOSIS — Z34.83 ENCOUNTER FOR SUPERVISION OF OTHER NORMAL PREGNANCY, THIRD TRIMESTER: Primary | ICD-10-CM

## 2023-09-25 DIAGNOSIS — O99.013 ANEMIA IN PREGNANCY, THIRD TRIMESTER: ICD-10-CM

## 2023-09-25 DIAGNOSIS — O99.013 ANEMIA IN PREGNANCY, THIRD TRIMESTER: Primary | ICD-10-CM

## 2023-09-25 DIAGNOSIS — Z23 ENCOUNTER FOR IMMUNIZATION: ICD-10-CM

## 2023-09-25 LAB
ERYTHROCYTE [DISTWIDTH] IN BLOOD BY AUTOMATED COUNT: 13.3 % (ref 10–15)
FERRITIN SERPL-MCNC: 15 NG/ML (ref 6–175)
HCT VFR BLD AUTO: 32.9 % (ref 35–47)
HGB BLD-MCNC: 10.8 G/DL (ref 11.7–15.7)
HGB BLD-MCNC: 10.9 G/DL (ref 11.7–15.7)
HOLD SPECIMEN: NORMAL
IRON BINDING CAPACITY (ROCHE): 461 UG/DL (ref 240–430)
IRON SATN MFR SERPL: 5 % (ref 15–46)
IRON SERPL-MCNC: 23 UG/DL (ref 37–145)
MCH RBC QN AUTO: 29.7 PG (ref 26.5–33)
MCHC RBC AUTO-ENTMCNC: 33.4 G/DL (ref 31.5–36.5)
MCV RBC AUTO: 89 FL (ref 78–100)
PLATELET # BLD AUTO: 133 10E3/UL (ref 150–450)
RBC # BLD AUTO: 3.7 10E6/UL (ref 3.8–5.2)
WBC # BLD AUTO: 8.6 10E3/UL (ref 4–11)

## 2023-09-25 PROCEDURE — 82728 ASSAY OF FERRITIN: CPT

## 2023-09-25 PROCEDURE — 85027 COMPLETE CBC AUTOMATED: CPT

## 2023-09-25 PROCEDURE — 90472 IMMUNIZATION ADMIN EACH ADD: CPT

## 2023-09-25 PROCEDURE — 83540 ASSAY OF IRON: CPT

## 2023-09-25 PROCEDURE — 87653 STREP B DNA AMP PROBE: CPT

## 2023-09-25 PROCEDURE — 90686 IIV4 VACC NO PRSV 0.5 ML IM: CPT

## 2023-09-25 PROCEDURE — 36415 COLL VENOUS BLD VENIPUNCTURE: CPT

## 2023-09-25 PROCEDURE — 99207 PR PRENATAL VISIT: CPT | Mod: 25

## 2023-09-25 PROCEDURE — 90471 IMMUNIZATION ADMIN: CPT

## 2023-09-25 PROCEDURE — 90715 TDAP VACCINE 7 YRS/> IM: CPT

## 2023-09-25 PROCEDURE — 83550 IRON BINDING TEST: CPT

## 2023-09-25 ASSESSMENT — ANXIETY QUESTIONNAIRES
1. FEELING NERVOUS, ANXIOUS, OR ON EDGE: NOT AT ALL
GAD7 TOTAL SCORE: 1
7. FEELING AFRAID AS IF SOMETHING AWFUL MIGHT HAPPEN: NOT AT ALL
2. NOT BEING ABLE TO STOP OR CONTROL WORRYING: NOT AT ALL
3. WORRYING TOO MUCH ABOUT DIFFERENT THINGS: NOT AT ALL
6. BECOMING EASILY ANNOYED OR IRRITABLE: SEVERAL DAYS
IF YOU CHECKED OFF ANY PROBLEMS ON THIS QUESTIONNAIRE, HOW DIFFICULT HAVE THESE PROBLEMS MADE IT FOR YOU TO DO YOUR WORK, TAKE CARE OF THINGS AT HOME, OR GET ALONG WITH OTHER PEOPLE: NOT DIFFICULT AT ALL
5. BEING SO RESTLESS THAT IT IS HARD TO SIT STILL: NOT AT ALL
GAD7 TOTAL SCORE: 1

## 2023-09-25 ASSESSMENT — PATIENT HEALTH QUESTIONNAIRE - PHQ9
5. POOR APPETITE OR OVEREATING: NOT AT ALL
SUM OF ALL RESPONSES TO PHQ QUESTIONS 1-9: 0

## 2023-09-25 NOTE — PROGRESS NOTES
36.6 wks today  No concerns. +FM per pt. Denies bleeding, LOF, or cramping. Having some irregular merna paula. Denies HA, RUQ pain, edema or visual dx. Has c/s scheduled for 10/10. Is very excited for her second son.     Gbs and ob HGB today  BSUS confirms cephalic  TDAP and flu vacc today  RTC in one week for mikhail Alonso, CAMACHO CNP  1. Encounter for supervision of other normal pregnancy, third trimester  - OB hemoglobin; Future  - Group B strep PCR; Future  - Group B strep PCR  - Extra Green Top Tube (LAB USE ONLY)  - OB hemoglobin    2. Encounter for immunization  - INFLUENZA VACCINE >6 MONTHS (AFLURIA/FLUZONE)  - TDAP 7+ (ADACEL,BOOSTRIX)

## 2023-09-26 DIAGNOSIS — O99.013 ANEMIA DURING PREGNANCY IN THIRD TRIMESTER: Primary | ICD-10-CM

## 2023-09-26 PROBLEM — O99.019 ANEMIA IN PREGNANCY: Status: ACTIVE | Noted: 2023-09-26

## 2023-09-26 LAB — GP B STREP DNA SPEC QL NAA+PROBE: POSITIVE

## 2023-09-26 RX ORDER — DIPHENHYDRAMINE HYDROCHLORIDE 50 MG/ML
50 INJECTION INTRAMUSCULAR; INTRAVENOUS
Status: CANCELLED
Start: 2023-09-26

## 2023-09-26 RX ORDER — FERROUS SULFATE 325(65) MG
325 TABLET ORAL
Qty: 30 TABLET | Refills: 1 | Status: SHIPPED | OUTPATIENT
Start: 2023-09-26

## 2023-09-26 RX ORDER — MULTIVIT WITH MINERALS/LUTEIN
250 TABLET ORAL DAILY
Qty: 30 TABLET | Refills: 1 | Status: SHIPPED | OUTPATIENT
Start: 2023-09-26

## 2023-09-26 RX ORDER — HEPARIN SODIUM,PORCINE 10 UNIT/ML
5-20 VIAL (ML) INTRAVENOUS DAILY PRN
Status: CANCELLED | OUTPATIENT
Start: 2023-09-26

## 2023-09-26 RX ORDER — EPINEPHRINE 1 MG/ML
0.3 INJECTION, SOLUTION, CONCENTRATE INTRAVENOUS EVERY 5 MIN PRN
Status: CANCELLED | OUTPATIENT
Start: 2023-09-26

## 2023-09-26 RX ORDER — HEPARIN SODIUM (PORCINE) LOCK FLUSH IV SOLN 100 UNIT/ML 100 UNIT/ML
5 SOLUTION INTRAVENOUS
Status: CANCELLED | OUTPATIENT
Start: 2023-09-26

## 2023-09-26 RX ORDER — ALBUTEROL SULFATE 0.83 MG/ML
2.5 SOLUTION RESPIRATORY (INHALATION)
Status: CANCELLED | OUTPATIENT
Start: 2023-09-26

## 2023-09-26 RX ORDER — LANOLIN ALCOHOL/MO/W.PET/CERES
1000 CREAM (GRAM) TOPICAL DAILY
Qty: 30 TABLET | Refills: 1 | Status: SHIPPED | OUTPATIENT
Start: 2023-09-26

## 2023-09-26 RX ORDER — ALBUTEROL SULFATE 90 UG/1
1-2 AEROSOL, METERED RESPIRATORY (INHALATION)
Status: CANCELLED
Start: 2023-09-26

## 2023-09-26 RX ORDER — METHYLPREDNISOLONE SODIUM SUCCINATE 125 MG/2ML
125 INJECTION, POWDER, LYOPHILIZED, FOR SOLUTION INTRAMUSCULAR; INTRAVENOUS
Status: CANCELLED
Start: 2023-09-26

## 2023-09-26 NOTE — PROGRESS NOTES
"\"Hi team,  Can we get this patient set up with IV iron. She has a scheduled upcoming c/s. Can we also send her PO meds as well for anemia.  Thank you!  CAMACHO Wheeler CNP \"  \"  Hi Byron,  You have anemia, I am having the nurses call you to set up some IV iron infusions hopefully we can get them scheduled before your surgery. Additionally you should start taking aking an iron supplement (ferrous sulfate 325mg), vitamin C (250 mg) and vitamin B12 (1000mcg) once daily in addition to your prenatal vitamin.  Iron deficiency anemia is common in pregnancy as pregnancy results in increased iron requirements for fetal/placental development and to increase maternal red blood cell mass. Iron supplements can sometimes cause metallic taste, nausea, GI upset constipation or your stool to turn black.       Look for a call from the nurses,  CAMACHO Wheeler CNP\"      IV iron infusions ordered.   Called patient to inform her of therapy plan. No answer, LVM with [facility]    Ordered PO meds per NP.    ALPA Ignacio    Patient called back. RN reviewed orders with patient. She states understanding and will call back with any questions.            "

## 2023-09-29 ENCOUNTER — INFUSION THERAPY VISIT (OUTPATIENT)
Dept: INFUSION THERAPY | Facility: CLINIC | Age: 23
End: 2023-09-29
Attending: PHYSICIAN ASSISTANT
Payer: COMMERCIAL

## 2023-09-29 VITALS — HEART RATE: 88 BPM | TEMPERATURE: 98 F | SYSTOLIC BLOOD PRESSURE: 99 MMHG | DIASTOLIC BLOOD PRESSURE: 63 MMHG

## 2023-09-29 DIAGNOSIS — O99.013 ANEMIA DURING PREGNANCY IN THIRD TRIMESTER: Primary | ICD-10-CM

## 2023-09-29 PROCEDURE — 250N000011 HC RX IP 250 OP 636

## 2023-09-29 PROCEDURE — 96365 THER/PROPH/DIAG IV INF INIT: CPT

## 2023-09-29 PROCEDURE — 258N000003 HC RX IP 258 OP 636

## 2023-09-29 PROCEDURE — 96366 THER/PROPH/DIAG IV INF ADDON: CPT

## 2023-09-29 RX ORDER — METHYLPREDNISOLONE SODIUM SUCCINATE 125 MG/2ML
125 INJECTION, POWDER, LYOPHILIZED, FOR SOLUTION INTRAMUSCULAR; INTRAVENOUS
Status: CANCELLED
Start: 2023-10-01

## 2023-09-29 RX ORDER — HEPARIN SODIUM,PORCINE 10 UNIT/ML
5-20 VIAL (ML) INTRAVENOUS DAILY PRN
Status: CANCELLED | OUTPATIENT
Start: 2023-10-01

## 2023-09-29 RX ORDER — ALBUTEROL SULFATE 0.83 MG/ML
2.5 SOLUTION RESPIRATORY (INHALATION)
Status: CANCELLED | OUTPATIENT
Start: 2023-10-01

## 2023-09-29 RX ORDER — DIPHENHYDRAMINE HYDROCHLORIDE 50 MG/ML
50 INJECTION INTRAMUSCULAR; INTRAVENOUS
Status: CANCELLED
Start: 2023-10-01

## 2023-09-29 RX ORDER — ALBUTEROL SULFATE 90 UG/1
1-2 AEROSOL, METERED RESPIRATORY (INHALATION)
Status: CANCELLED
Start: 2023-10-01

## 2023-09-29 RX ORDER — HEPARIN SODIUM (PORCINE) LOCK FLUSH IV SOLN 100 UNIT/ML 100 UNIT/ML
5 SOLUTION INTRAVENOUS
Status: CANCELLED | OUTPATIENT
Start: 2023-10-01

## 2023-09-29 RX ORDER — EPINEPHRINE 1 MG/ML
0.3 INJECTION, SOLUTION INTRAMUSCULAR; SUBCUTANEOUS EVERY 5 MIN PRN
Status: CANCELLED | OUTPATIENT
Start: 2023-10-01

## 2023-09-29 RX ADMIN — IRON SUCROSE 300 MG: 20 INJECTION, SOLUTION INTRAVENOUS at 07:23

## 2023-09-29 NOTE — PROGRESS NOTES
Nursing Note  Byron Vásquez presents today to [facility] for:   Chief Complaint   Patient presents with    Infusion     Venofer       During today's [facility] appointment, orders from LETITIA Alonso were completed.  Frequency: first dose; today is dose 1 of 3 total    Progress note:  Patient identification verified by name and date of birth.  Assessment completed.  Vitals recorded in Doc Flowsheets.  Patient was provided with education regarding medication/procedure and possible side effects.  Patient verbalized understanding.     present during visit today: Yes, Language: Italian  available via telephone if needed but pt speaks/understands english well.     Treatment Conditions: Patient monitored for 20 minutes after medication was administered for first dose protocol    Premedications: were not ordered.    Drug Waste Record: No    Infusion length and rate:  infusion given over approximately  90 minutes  193 ml/hr.    Labs: were not ordered for this appointment.    Vascular access: peripheral IV placed today.    Is the next appt scheduled? yes    Post Infusion Assessment:  Patient tolerated infusion without incident.     Discharge Plan:   Follow up plan of care with: ongoing infusions at [facility].  Discharge instructions were reviewed with patient.  Patient/representative verbalized understanding of discharge instructions and all questions answered.  Patient discharged from [facility] in stable condition.    Bing Solano RN    Administrations This Visit       iron sucrose (VENOFER) 300 mg in sodium chloride 0.9 % 290 mL intermittent infusion       Admin Date  09/29/2023 Action  $New Bag Dose  300 mg Rate  193.3 mL/hr Route  Intravenous Administered By  Bing Solano RN                    BP 99/63 (BP Location: Left arm, Patient Position: Sitting, Cuff Size: Adult  Regular)   Pulse 88   Temp 98  F (36.7  C) (Oral)   LMP 01/10/2023

## 2023-10-02 ENCOUNTER — INFUSION THERAPY VISIT (OUTPATIENT)
Dept: INFUSION THERAPY | Facility: CLINIC | Age: 23
End: 2023-10-02
Attending: INTERNAL MEDICINE
Payer: COMMERCIAL

## 2023-10-02 VITALS
OXYGEN SATURATION: 98 % | HEART RATE: 82 BPM | BODY MASS INDEX: 28.11 KG/M2 | DIASTOLIC BLOOD PRESSURE: 73 MMHG | TEMPERATURE: 97.8 F | SYSTOLIC BLOOD PRESSURE: 106 MMHG | RESPIRATION RATE: 16 BRPM | WEIGHT: 168.9 LBS

## 2023-10-02 DIAGNOSIS — O99.013 ANEMIA DURING PREGNANCY IN THIRD TRIMESTER: Primary | ICD-10-CM

## 2023-10-02 PROCEDURE — 96366 THER/PROPH/DIAG IV INF ADDON: CPT

## 2023-10-02 PROCEDURE — 96365 THER/PROPH/DIAG IV INF INIT: CPT

## 2023-10-02 PROCEDURE — 250N000011 HC RX IP 250 OP 636: Mod: JZ

## 2023-10-02 PROCEDURE — 258N000003 HC RX IP 258 OP 636

## 2023-10-02 RX ORDER — ALBUTEROL SULFATE 0.83 MG/ML
2.5 SOLUTION RESPIRATORY (INHALATION)
Status: CANCELLED | OUTPATIENT
Start: 2023-10-03

## 2023-10-02 RX ORDER — HEPARIN SODIUM,PORCINE 10 UNIT/ML
5-20 VIAL (ML) INTRAVENOUS DAILY PRN
Status: CANCELLED | OUTPATIENT
Start: 2023-10-03

## 2023-10-02 RX ORDER — DIPHENHYDRAMINE HYDROCHLORIDE 50 MG/ML
50 INJECTION INTRAMUSCULAR; INTRAVENOUS
Status: CANCELLED
Start: 2023-10-03

## 2023-10-02 RX ORDER — EPINEPHRINE 1 MG/ML
0.3 INJECTION, SOLUTION INTRAMUSCULAR; SUBCUTANEOUS EVERY 5 MIN PRN
Status: CANCELLED | OUTPATIENT
Start: 2023-10-03

## 2023-10-02 RX ORDER — HEPARIN SODIUM (PORCINE) LOCK FLUSH IV SOLN 100 UNIT/ML 100 UNIT/ML
5 SOLUTION INTRAVENOUS
Status: CANCELLED | OUTPATIENT
Start: 2023-10-03

## 2023-10-02 RX ORDER — METHYLPREDNISOLONE SODIUM SUCCINATE 125 MG/2ML
125 INJECTION, POWDER, LYOPHILIZED, FOR SOLUTION INTRAMUSCULAR; INTRAVENOUS
Status: CANCELLED
Start: 2023-10-03

## 2023-10-02 RX ORDER — ALBUTEROL SULFATE 90 UG/1
1-2 AEROSOL, METERED RESPIRATORY (INHALATION)
Status: CANCELLED
Start: 2023-10-03

## 2023-10-02 RX ADMIN — SODIUM CHLORIDE 250 ML: 9 INJECTION, SOLUTION INTRAVENOUS at 08:19

## 2023-10-02 RX ADMIN — IRON SUCROSE 300 MG: 20 INJECTION, SOLUTION INTRAVENOUS at 08:19

## 2023-10-02 NOTE — PROGRESS NOTES
Infusion Nursing Note:  Byron Vásquez presents today for venofer #2.    Patient seen by provider today: No   present during visit today: Not Applicable.    Note: N/A.      Intravenous Access:  Peripheral IV placed.    Treatment Conditions:  Not Applicable.      Post Infusion Assessment:  Patient tolerated infusion without incident.  Blood return noted pre and post infusion.  Site patent and intact, free from redness, edema or discomfort.  No evidence of extravasations.  Access discontinued per protocol.       Discharge Plan:   Discharge instructions reviewed with: Patient.  Patient and/or family verbalized understanding of discharge instructions and all questions answered.  AVS to patient via [employer].  Patient will return 10/4/23 for next appointment.   Patient discharged in stable condition accompanied by: self.  Departure Mode: Ambulatory.      Kari Schoen, RN

## 2023-10-03 DIAGNOSIS — Z01.818 PRE-OP EXAM: Primary | ICD-10-CM

## 2023-10-03 DIAGNOSIS — O99.013 ANEMIA DURING PREGNANCY IN THIRD TRIMESTER: ICD-10-CM

## 2023-10-04 ENCOUNTER — PRENATAL OFFICE VISIT (OUTPATIENT)
Dept: OBGYN | Facility: CLINIC | Age: 23
End: 2023-10-04
Payer: COMMERCIAL

## 2023-10-04 ENCOUNTER — INFUSION THERAPY VISIT (OUTPATIENT)
Dept: INFUSION THERAPY | Facility: CLINIC | Age: 23
End: 2023-10-04
Attending: INTERNAL MEDICINE
Payer: COMMERCIAL

## 2023-10-04 VITALS
DIASTOLIC BLOOD PRESSURE: 68 MMHG | TEMPERATURE: 97.7 F | SYSTOLIC BLOOD PRESSURE: 103 MMHG | OXYGEN SATURATION: 98 % | RESPIRATION RATE: 16 BRPM | HEART RATE: 79 BPM

## 2023-10-04 VITALS
WEIGHT: 169.6 LBS | HEART RATE: 71 BPM | OXYGEN SATURATION: 99 % | SYSTOLIC BLOOD PRESSURE: 109 MMHG | DIASTOLIC BLOOD PRESSURE: 75 MMHG | BODY MASS INDEX: 28.22 KG/M2

## 2023-10-04 DIAGNOSIS — O99.013 ANEMIA DURING PREGNANCY IN THIRD TRIMESTER: Primary | ICD-10-CM

## 2023-10-04 DIAGNOSIS — Z34.83 ENCOUNTER FOR SUPERVISION OF OTHER NORMAL PREGNANCY, THIRD TRIMESTER: Primary | ICD-10-CM

## 2023-10-04 PROCEDURE — 250N000011 HC RX IP 250 OP 636: Mod: JZ

## 2023-10-04 PROCEDURE — 99207 PR PRENATAL VISIT: CPT

## 2023-10-04 PROCEDURE — 96365 THER/PROPH/DIAG IV INF INIT: CPT

## 2023-10-04 PROCEDURE — 258N000003 HC RX IP 258 OP 636

## 2023-10-04 RX ORDER — HEPARIN SODIUM,PORCINE 10 UNIT/ML
5-20 VIAL (ML) INTRAVENOUS DAILY PRN
Status: CANCELLED | OUTPATIENT
Start: 2023-10-04

## 2023-10-04 RX ORDER — ALBUTEROL SULFATE 0.83 MG/ML
2.5 SOLUTION RESPIRATORY (INHALATION)
Status: CANCELLED | OUTPATIENT
Start: 2023-10-04

## 2023-10-04 RX ORDER — HEPARIN SODIUM (PORCINE) LOCK FLUSH IV SOLN 100 UNIT/ML 100 UNIT/ML
5 SOLUTION INTRAVENOUS
Status: CANCELLED | OUTPATIENT
Start: 2023-10-04

## 2023-10-04 RX ORDER — ALBUTEROL SULFATE 90 UG/1
1-2 AEROSOL, METERED RESPIRATORY (INHALATION)
Status: CANCELLED
Start: 2023-10-04

## 2023-10-04 RX ORDER — METHYLPREDNISOLONE SODIUM SUCCINATE 125 MG/2ML
125 INJECTION, POWDER, LYOPHILIZED, FOR SOLUTION INTRAMUSCULAR; INTRAVENOUS
Status: CANCELLED
Start: 2023-10-04

## 2023-10-04 RX ORDER — DIPHENHYDRAMINE HYDROCHLORIDE 50 MG/ML
50 INJECTION INTRAMUSCULAR; INTRAVENOUS
Status: CANCELLED
Start: 2023-10-04

## 2023-10-04 RX ORDER — EPINEPHRINE 1 MG/ML
0.3 INJECTION, SOLUTION INTRAMUSCULAR; SUBCUTANEOUS EVERY 5 MIN PRN
Status: CANCELLED | OUTPATIENT
Start: 2023-10-04

## 2023-10-04 RX ADMIN — IRON SUCROSE 300 MG: 20 INJECTION, SOLUTION INTRAVENOUS at 08:58

## 2023-10-04 RX ADMIN — SODIUM CHLORIDE 250 ML: 9 INJECTION, SOLUTION INTRAVENOUS at 08:58

## 2023-10-04 NOTE — PROGRESS NOTES
38.1 wks today  Feeling well.  +FM  No ctx, cramping, bleeding or LOF.   No concerns, is ready for baby!   Has c/s on 10/10, has lab only on 10/9  Reviewed what time to check in, fasting, lab draw etc.   Rtc weekly or PRN  CAMACHO Wheeler CNP

## 2023-10-04 NOTE — PROGRESS NOTES
Infusion Nursing Note:  Byron Yifanbrant Vásquez presents today for Venofer #3/3.    Patient seen by provider today: No   present during visit today: Not Applicable.    Note: N/A.      Intravenous Access:  Peripheral IV placed.    Treatment Conditions:  Not Applicable.      Post Infusion Assessment:  Access discontinued per protocol.       Discharge Plan:   AVS to patient via MYCHART.  Patient will return no further infusions scheduled for next appointment.   Patient discharged in stable condition accompanied by: self.  Departure Mode: Ambulatory.      Sari Alberto RN

## 2023-10-08 LAB
ABO/RH(D): NORMAL
ANTIBODY SCREEN: NEGATIVE
SPECIMEN EXPIRATION DATE: NORMAL

## 2023-10-09 ENCOUNTER — TELEPHONE (OUTPATIENT)
Dept: OBGYN | Facility: CLINIC | Age: 23
End: 2023-10-09

## 2023-10-09 ENCOUNTER — LAB (OUTPATIENT)
Dept: LAB | Facility: CLINIC | Age: 23
End: 2023-10-09
Payer: COMMERCIAL

## 2023-10-09 ENCOUNTER — ANESTHESIA EVENT (OUTPATIENT)
Dept: OBGYN | Facility: CLINIC | Age: 23
End: 2023-10-09
Payer: COMMERCIAL

## 2023-10-09 DIAGNOSIS — Z01.818 PRE-OP EXAM: ICD-10-CM

## 2023-10-09 DIAGNOSIS — O99.013 ANEMIA DURING PREGNANCY IN THIRD TRIMESTER: ICD-10-CM

## 2023-10-09 LAB
ERYTHROCYTE [DISTWIDTH] IN BLOOD BY AUTOMATED COUNT: 15.7 % (ref 10–15)
HCT VFR BLD AUTO: 34 % (ref 35–47)
HGB BLD-MCNC: 11.3 G/DL (ref 11.7–15.7)
MCH RBC QN AUTO: 29.8 PG (ref 26.5–33)
MCHC RBC AUTO-ENTMCNC: 33.2 G/DL (ref 31.5–36.5)
MCV RBC AUTO: 90 FL (ref 78–100)
PLATELET # BLD AUTO: 147 10E3/UL (ref 150–450)
RBC # BLD AUTO: 3.79 10E6/UL (ref 3.8–5.2)
T PALLIDUM AB SER QL: NONREACTIVE
WBC # BLD AUTO: 10.8 10E3/UL (ref 4–11)

## 2023-10-09 PROCEDURE — 85027 COMPLETE CBC AUTOMATED: CPT

## 2023-10-09 PROCEDURE — 86850 RBC ANTIBODY SCREEN: CPT

## 2023-10-09 PROCEDURE — 86901 BLOOD TYPING SEROLOGIC RH(D): CPT

## 2023-10-09 PROCEDURE — 36415 COLL VENOUS BLD VENIPUNCTURE: CPT

## 2023-10-09 PROCEDURE — 86900 BLOOD TYPING SEROLOGIC ABO: CPT

## 2023-10-09 PROCEDURE — 86780 TREPONEMA PALLIDUM: CPT

## 2023-10-09 NOTE — ANESTHESIA PREPROCEDURE EVALUATION
Anesthesia Pre-Procedure Evaluation    Patient: Byron Vásquez   MRN: [id number] : 2000        Procedure : Procedure(s):   SECTION          Past Medical History:   Diagnosis Date    Anemia in pregnancy 2023    Varicella       Past Surgical History:   Procedure Laterality Date     SECTION        No Known Allergies   Social History     Tobacco Use    Smoking status: Never    Smokeless tobacco: Never   Substance Use Topics    Alcohol use: Never      Wt Readings from Last 1 Encounters:   10/04/23 76.9 kg (169 lb 9.6 oz)        Anesthesia Evaluation            ROS/MED HX  ENT/Pulmonary:    (-) tobacco use   Neurologic:  - neg neurologic ROS     Cardiovascular:  - neg cardiovascular ROS     METS/Exercise Tolerance: 4 - Raking leaves, gardening    Hematologic:     (+)      anemia,          Musculoskeletal:  - neg musculoskeletal ROS     GI/Hepatic:  - neg GI/hepatic ROS     Renal/Genitourinary:  - neg Renal ROS     Endo:  - neg endo ROS     Psychiatric/Substance Use:     (+) psychiatric history depression       Infectious Disease:  - neg infectious disease ROS     Malignancy:  - neg malignancy ROS     Other: Comment:  38w6d  Hx of    Anemia in pregnancy  Hx of uterine leiomyoma.         Physical Exam    Airway  airway exam normal      Mallampati: II   TM distance: > 3 FB   Neck ROM: full   Mouth opening: > 3 cm    Respiratory Devices and Support         Dental       (+) Minor Abnormalities - some fillings, tiny chips      Cardiovascular   cardiovascular exam normal          Pulmonary   pulmonary exam normal                OUTSIDE LABS:  CBC:   Lab Results   Component Value Date    WBC 8.6 2023    WBC 7.6 2023    HGB 10.9 (L) 2023    HGB 10.8 (L) 2023    HCT 32.9 (L) 2023    HCT 34.7 (L) 2023     (L) 2023     2023     BMP: No results found for: NA, POTASSIUM, CHLORIDE, CO2, BUN, CR, GLC  COAGS: No results found for:  PTT, INR, FIBR  POC: No results found for: BGM, HCG, HCGS  HEPATIC: No results found for: ALBUMIN, PROTTOTAL, ALT, AST, GGT, ALKPHOS, BILITOTAL, BILIDIRECT, CLAYTON  OTHER: No results found for: PH, LACT, A1C, BEN, PHOS, MAG, LIPASE, AMYLASE, TSH, T4, T3, CRP, SED    Anesthesia Plan    ASA Status:  2    NPO Status:  NPO Appropriate    Anesthesia Type: Spinal.              Consents    Anesthesia Plan(s) and associated risks, benefits, and realistic alternatives discussed. Questions answered and patient/representative(s) expressed understanding.     - Discussed:     - Discussed with:  Patient      - Extended Intubation/Ventilatory Support Discussed: No.      - Patient is DNR/DNI Status: No     Use of blood products discussed: Yes.     - Discussed with: Patient.     - Consented: consented to blood products            Reason for refusal: other.     Postoperative Care    Pain management: Oral pain medications, Multi-modal analgesia.   PONV prophylaxis: Ondansetron (or other 5HT-3)     Comments:                Moises Stephens MD on 10/9/2023 at 5:08 PM

## 2023-10-09 NOTE — TELEPHONE ENCOUNTER
Called patient to discuss that the RNs will be able to help as needed.  Patient needed an answer if her nurse will take her baby overnight and Triage explain that the baby will start with her.  Patient has concerns about having to take care of the baby at night.  Triage RN expressed that it is hard to say where she is going to need help until after her delivery and that after delivery we will be able to better assist her needs.    Patient wanted  to happen at [facility] - RN explained this cannot be transferred there as they are outside of .    Gale Pinto RN'

## 2023-10-09 NOTE — TELEPHONE ENCOUNTER
[facility] Call Center    Phone Message    May a detailed message be left on voicemail: yes     Reason for Call: Other: pt is calling, she has a a  scheduled for 10/10/23, pt stated she will be coming alone and is wondering if the hospital will help her care for her baby while she is recovering, please call pt, thank you!     Action Taken: Message routed to:  Other: obgyn    Travel Screening: Not Applicable

## 2023-10-10 ENCOUNTER — ANESTHESIA (OUTPATIENT)
Dept: OBGYN | Facility: CLINIC | Age: 23
End: 2023-10-10
Payer: COMMERCIAL

## 2023-10-10 ENCOUNTER — HOSPITAL ENCOUNTER (INPATIENT)
Facility: CLINIC | Age: 23
LOS: 2 days | Discharge: HOME | End: 2023-10-12
Attending: OBSTETRICS & GYNECOLOGY | Admitting: OBSTETRICS & GYNECOLOGY
Payer: COMMERCIAL

## 2023-10-10 DIAGNOSIS — O99.013 ANEMIA DURING PREGNANCY IN THIRD TRIMESTER: ICD-10-CM

## 2023-10-10 DIAGNOSIS — Z98.891 S/P CESAREAN SECTION: Primary | ICD-10-CM

## 2023-10-10 PROCEDURE — 258N000003 HC RX IP 258 OP 636

## 2023-10-10 PROCEDURE — 250N000013 HC RX MED GY IP 250 OP 250 PS 637: Performed by: OBSTETRICS & GYNECOLOGY

## 2023-10-10 PROCEDURE — 360N000076 HC SURGERY LEVEL 3, PER MIN: Performed by: OBSTETRICS & GYNECOLOGY

## 2023-10-10 PROCEDURE — 272N000001 HC OR GENERAL SUPPLY STERILE: Performed by: OBSTETRICS & GYNECOLOGY

## 2023-10-10 PROCEDURE — 250N000009 HC RX 250: Performed by: OBSTETRICS & GYNECOLOGY

## 2023-10-10 PROCEDURE — 250N000011 HC RX IP 250 OP 636: Mod: JZ | Performed by: STUDENT IN AN ORGANIZED HEALTH CARE EDUCATION/TRAINING PROGRAM

## 2023-10-10 PROCEDURE — 250N000011 HC RX IP 250 OP 636: Performed by: STUDENT IN AN ORGANIZED HEALTH CARE EDUCATION/TRAINING PROGRAM

## 2023-10-10 PROCEDURE — 250N000011 HC RX IP 250 OP 636: Mod: JZ | Performed by: NURSE ANESTHETIST, CERTIFIED REGISTERED

## 2023-10-10 PROCEDURE — 250N000013 HC RX MED GY IP 250 OP 250 PS 637

## 2023-10-10 PROCEDURE — 710N000010 HC RECOVERY PHASE 1, LEVEL 2, PER MIN: Performed by: OBSTETRICS & GYNECOLOGY

## 2023-10-10 PROCEDURE — 999N000141 HC STATISTIC PRE-PROCEDURE NURSING ASSESSMENT: Performed by: OBSTETRICS & GYNECOLOGY

## 2023-10-10 PROCEDURE — 59510 CESAREAN DELIVERY: CPT | Mod: GC | Performed by: OBSTETRICS & GYNECOLOGY

## 2023-10-10 PROCEDURE — 271N000001 HC OR GENERAL SUPPLY NON-STERILE: Performed by: OBSTETRICS & GYNECOLOGY

## 2023-10-10 PROCEDURE — C9290 INJ, BUPIVACAINE LIPOSOME: HCPCS | Performed by: STUDENT IN AN ORGANIZED HEALTH CARE EDUCATION/TRAINING PROGRAM

## 2023-10-10 PROCEDURE — 370N000017 HC ANESTHESIA TECHNICAL FEE, PER MIN: Performed by: OBSTETRICS & GYNECOLOGY

## 2023-10-10 PROCEDURE — 250N000011 HC RX IP 250 OP 636: Mod: JZ

## 2023-10-10 PROCEDURE — 250N000011 HC RX IP 250 OP 636: Performed by: OBSTETRICS & GYNECOLOGY

## 2023-10-10 PROCEDURE — 250N000009 HC RX 250

## 2023-10-10 PROCEDURE — 120N000002 HC R&B MED SURG/OB UMMC

## 2023-10-10 RX ORDER — BISACODYL 10 MG
10 SUPPOSITORY, RECTAL RECTAL DAILY PRN
Status: DISCONTINUED | OUTPATIENT
Start: 2023-10-12 | End: 2023-10-12 | Stop reason: HOSPADM

## 2023-10-10 RX ORDER — DEXTROSE, SODIUM CHLORIDE, SODIUM LACTATE, POTASSIUM CHLORIDE, AND CALCIUM CHLORIDE 5; .6; .31; .03; .02 G/100ML; G/100ML; G/100ML; G/100ML; G/100ML
INJECTION, SOLUTION INTRAVENOUS CONTINUOUS
Status: DISCONTINUED | OUTPATIENT
Start: 2023-10-10 | End: 2023-10-12 | Stop reason: HOSPADM

## 2023-10-10 RX ORDER — FENTANYL CITRATE 50 UG/ML
50 INJECTION, SOLUTION INTRAMUSCULAR; INTRAVENOUS EVERY 5 MIN PRN
Status: DISCONTINUED | OUTPATIENT
Start: 2023-10-10 | End: 2023-10-10 | Stop reason: HOSPADM

## 2023-10-10 RX ORDER — PROCHLORPERAZINE MALEATE 10 MG
10 TABLET ORAL EVERY 6 HOURS PRN
Status: DISCONTINUED | OUTPATIENT
Start: 2023-10-10 | End: 2023-10-12 | Stop reason: HOSPADM

## 2023-10-10 RX ORDER — AMOXICILLIN 250 MG
1 CAPSULE ORAL 2 TIMES DAILY
Status: DISCONTINUED | OUTPATIENT
Start: 2023-10-10 | End: 2023-10-12 | Stop reason: HOSPADM

## 2023-10-10 RX ORDER — OXYTOCIN 10 [USP'U]/ML
10 INJECTION, SOLUTION INTRAMUSCULAR; INTRAVENOUS
Status: DISCONTINUED | OUTPATIENT
Start: 2023-10-10 | End: 2023-10-10

## 2023-10-10 RX ORDER — LIDOCAINE 40 MG/G
CREAM TOPICAL
Status: DISCONTINUED | OUTPATIENT
Start: 2023-10-10 | End: 2023-10-10 | Stop reason: HOSPADM

## 2023-10-10 RX ORDER — KETOROLAC TROMETHAMINE 30 MG/ML
INJECTION, SOLUTION INTRAMUSCULAR; INTRAVENOUS PRN
Status: DISCONTINUED | OUTPATIENT
Start: 2023-10-10 | End: 2023-10-10

## 2023-10-10 RX ORDER — HYDROCORTISONE 25 MG/G
CREAM TOPICAL 3 TIMES DAILY PRN
Status: DISCONTINUED | OUTPATIENT
Start: 2023-10-10 | End: 2023-10-12 | Stop reason: HOSPADM

## 2023-10-10 RX ORDER — IBUPROFEN 800 MG/1
800 TABLET, FILM COATED ORAL EVERY 6 HOURS
Status: DISCONTINUED | OUTPATIENT
Start: 2023-10-11 | End: 2023-10-12 | Stop reason: HOSPADM

## 2023-10-10 RX ORDER — MISOPROSTOL 200 UG/1
800 TABLET ORAL
Status: DISCONTINUED | OUTPATIENT
Start: 2023-10-10 | End: 2023-10-12 | Stop reason: HOSPADM

## 2023-10-10 RX ORDER — KETOROLAC TROMETHAMINE 30 MG/ML
30 INJECTION, SOLUTION INTRAMUSCULAR; INTRAVENOUS EVERY 6 HOURS
Status: COMPLETED | OUTPATIENT
Start: 2023-10-10 | End: 2023-10-11

## 2023-10-10 RX ORDER — ACETAMINOPHEN 325 MG/1
975 TABLET ORAL EVERY 6 HOURS
Status: DISCONTINUED | OUTPATIENT
Start: 2023-10-10 | End: 2023-10-12 | Stop reason: HOSPADM

## 2023-10-10 RX ORDER — CARBOPROST TROMETHAMINE 250 UG/ML
250 INJECTION, SOLUTION INTRAMUSCULAR
Status: DISCONTINUED | OUTPATIENT
Start: 2023-10-10 | End: 2023-10-12 | Stop reason: HOSPADM

## 2023-10-10 RX ORDER — NALOXONE HYDROCHLORIDE 0.4 MG/ML
0.2 INJECTION, SOLUTION INTRAMUSCULAR; INTRAVENOUS; SUBCUTANEOUS
Status: DISCONTINUED | OUTPATIENT
Start: 2023-10-10 | End: 2023-10-12 | Stop reason: HOSPADM

## 2023-10-10 RX ORDER — MISOPROSTOL 200 UG/1
800 TABLET ORAL
Status: DISCONTINUED | OUTPATIENT
Start: 2023-10-10 | End: 2023-10-10 | Stop reason: HOSPADM

## 2023-10-10 RX ORDER — SIMETHICONE 80 MG
80 TABLET,CHEWABLE ORAL 4 TIMES DAILY PRN
Status: DISCONTINUED | OUTPATIENT
Start: 2023-10-10 | End: 2023-10-12 | Stop reason: HOSPADM

## 2023-10-10 RX ORDER — MISOPROSTOL 200 UG/1
400 TABLET ORAL
Status: DISCONTINUED | OUTPATIENT
Start: 2023-10-10 | End: 2023-10-10 | Stop reason: HOSPADM

## 2023-10-10 RX ORDER — OXYTOCIN 10 [USP'U]/ML
10 INJECTION, SOLUTION INTRAMUSCULAR; INTRAVENOUS
Status: DISCONTINUED | OUTPATIENT
Start: 2023-10-10 | End: 2023-10-10 | Stop reason: HOSPADM

## 2023-10-10 RX ORDER — OXYTOCIN/0.9 % SODIUM CHLORIDE 30/500 ML
340 PLASTIC BAG, INJECTION (ML) INTRAVENOUS CONTINUOUS PRN
Status: DISCONTINUED | OUTPATIENT
Start: 2023-10-10 | End: 2023-10-10 | Stop reason: HOSPADM

## 2023-10-10 RX ORDER — ONDANSETRON 4 MG/1
4 TABLET, ORALLY DISINTEGRATING ORAL EVERY 6 HOURS PRN
Status: DISCONTINUED | OUTPATIENT
Start: 2023-10-10 | End: 2023-10-12 | Stop reason: HOSPADM

## 2023-10-10 RX ORDER — NALOXONE HYDROCHLORIDE 0.4 MG/ML
0.4 INJECTION, SOLUTION INTRAMUSCULAR; INTRAVENOUS; SUBCUTANEOUS
Status: DISCONTINUED | OUTPATIENT
Start: 2023-10-10 | End: 2023-10-10

## 2023-10-10 RX ORDER — TRANEXAMIC ACID 10 MG/ML
1 INJECTION, SOLUTION INTRAVENOUS EVERY 30 MIN PRN
Status: DISCONTINUED | OUTPATIENT
Start: 2023-10-10 | End: 2023-10-12 | Stop reason: HOSPADM

## 2023-10-10 RX ORDER — ONDANSETRON 4 MG/1
4 TABLET, ORALLY DISINTEGRATING ORAL EVERY 30 MIN PRN
Status: DISCONTINUED | OUTPATIENT
Start: 2023-10-10 | End: 2023-10-10 | Stop reason: HOSPADM

## 2023-10-10 RX ORDER — ONDANSETRON 2 MG/ML
4 INJECTION INTRAMUSCULAR; INTRAVENOUS EVERY 30 MIN PRN
Status: DISCONTINUED | OUTPATIENT
Start: 2023-10-10 | End: 2023-10-10 | Stop reason: HOSPADM

## 2023-10-10 RX ORDER — MODIFIED LANOLIN
OINTMENT (GRAM) TOPICAL
Status: DISCONTINUED | OUTPATIENT
Start: 2023-10-10 | End: 2023-10-12 | Stop reason: HOSPADM

## 2023-10-10 RX ORDER — TRANEXAMIC ACID 10 MG/ML
1 INJECTION, SOLUTION INTRAVENOUS EVERY 30 MIN PRN
Status: DISCONTINUED | OUTPATIENT
Start: 2023-10-10 | End: 2023-10-10 | Stop reason: HOSPADM

## 2023-10-10 RX ORDER — NALOXONE HYDROCHLORIDE 0.4 MG/ML
0.4 INJECTION, SOLUTION INTRAMUSCULAR; INTRAVENOUS; SUBCUTANEOUS
Status: DISCONTINUED | OUTPATIENT
Start: 2023-10-10 | End: 2023-10-12 | Stop reason: HOSPADM

## 2023-10-10 RX ORDER — CITRIC ACID/SODIUM CITRATE 334-500MG
30 SOLUTION, ORAL ORAL
Status: COMPLETED | OUTPATIENT
Start: 2023-10-10 | End: 2023-10-10

## 2023-10-10 RX ORDER — SODIUM CHLORIDE, SODIUM LACTATE, POTASSIUM CHLORIDE, CALCIUM CHLORIDE 600; 310; 30; 20 MG/100ML; MG/100ML; MG/100ML; MG/100ML
INJECTION, SOLUTION INTRAVENOUS CONTINUOUS
Status: DISCONTINUED | OUTPATIENT
Start: 2023-10-10 | End: 2023-10-10 | Stop reason: HOSPADM

## 2023-10-10 RX ORDER — METHYLERGONOVINE MALEATE 0.2 MG/ML
200 INJECTION INTRAVENOUS
Status: DISCONTINUED | OUTPATIENT
Start: 2023-10-10 | End: 2023-10-10 | Stop reason: HOSPADM

## 2023-10-10 RX ORDER — CEFAZOLIN SODIUM/WATER 2 G/20 ML
2 SYRINGE (ML) INTRAVENOUS
Status: COMPLETED | OUTPATIENT
Start: 2023-10-10 | End: 2023-10-10

## 2023-10-10 RX ORDER — CARBOPROST TROMETHAMINE 250 UG/ML
250 INJECTION, SOLUTION INTRAMUSCULAR
Status: DISCONTINUED | OUTPATIENT
Start: 2023-10-10 | End: 2023-10-10 | Stop reason: HOSPADM

## 2023-10-10 RX ORDER — LIDOCAINE 40 MG/G
CREAM TOPICAL
Status: DISCONTINUED | OUTPATIENT
Start: 2023-10-10 | End: 2023-10-12 | Stop reason: HOSPADM

## 2023-10-10 RX ORDER — OXYTOCIN 10 [USP'U]/ML
10 INJECTION, SOLUTION INTRAMUSCULAR; INTRAVENOUS
Status: DISCONTINUED | OUTPATIENT
Start: 2023-10-10 | End: 2023-10-12 | Stop reason: HOSPADM

## 2023-10-10 RX ORDER — METOCLOPRAMIDE 10 MG/1
10 TABLET ORAL EVERY 6 HOURS PRN
Status: DISCONTINUED | OUTPATIENT
Start: 2023-10-10 | End: 2023-10-12 | Stop reason: HOSPADM

## 2023-10-10 RX ORDER — OXYCODONE HYDROCHLORIDE 5 MG/1
5 TABLET ORAL EVERY 4 HOURS PRN
Status: DISCONTINUED | OUTPATIENT
Start: 2023-10-10 | End: 2023-10-12 | Stop reason: HOSPADM

## 2023-10-10 RX ORDER — NALOXONE HYDROCHLORIDE 0.4 MG/ML
0.2 INJECTION, SOLUTION INTRAMUSCULAR; INTRAVENOUS; SUBCUTANEOUS
Status: DISCONTINUED | OUTPATIENT
Start: 2023-10-10 | End: 2023-10-10

## 2023-10-10 RX ORDER — BUPIVACAINE HYDROCHLORIDE 2.5 MG/ML
INJECTION, SOLUTION EPIDURAL; INFILTRATION; INTRACAUDAL
Status: DISCONTINUED | OUTPATIENT
Start: 2023-10-10 | End: 2023-10-10

## 2023-10-10 RX ORDER — AMOXICILLIN 250 MG
2 CAPSULE ORAL 2 TIMES DAILY
Status: DISCONTINUED | OUTPATIENT
Start: 2023-10-10 | End: 2023-10-12 | Stop reason: HOSPADM

## 2023-10-10 RX ORDER — GLYCOPYRROLATE 0.2 MG/ML
INJECTION, SOLUTION INTRAMUSCULAR; INTRAVENOUS PRN
Status: DISCONTINUED | OUTPATIENT
Start: 2023-10-10 | End: 2023-10-10

## 2023-10-10 RX ORDER — CEFAZOLIN SODIUM/WATER 2 G/20 ML
2 SYRINGE (ML) INTRAVENOUS SEE ADMIN INSTRUCTIONS
Status: DISCONTINUED | OUTPATIENT
Start: 2023-10-10 | End: 2023-10-10 | Stop reason: HOSPADM

## 2023-10-10 RX ORDER — MISOPROSTOL 200 UG/1
400 TABLET ORAL
Status: DISCONTINUED | OUTPATIENT
Start: 2023-10-10 | End: 2023-10-12 | Stop reason: HOSPADM

## 2023-10-10 RX ORDER — FENTANYL CITRATE 50 UG/ML
25 INJECTION, SOLUTION INTRAMUSCULAR; INTRAVENOUS EVERY 5 MIN PRN
Status: DISCONTINUED | OUTPATIENT
Start: 2023-10-10 | End: 2023-10-10 | Stop reason: HOSPADM

## 2023-10-10 RX ORDER — ACETAMINOPHEN 325 MG/1
975 TABLET ORAL ONCE
Status: COMPLETED | OUTPATIENT
Start: 2023-10-10 | End: 2023-10-10

## 2023-10-10 RX ORDER — SODIUM CHLORIDE, SODIUM LACTATE, POTASSIUM CHLORIDE, CALCIUM CHLORIDE 600; 310; 30; 20 MG/100ML; MG/100ML; MG/100ML; MG/100ML
INJECTION, SOLUTION INTRAVENOUS CONTINUOUS PRN
Status: DISCONTINUED | OUTPATIENT
Start: 2023-10-10 | End: 2023-10-10

## 2023-10-10 RX ORDER — NALBUPHINE HYDROCHLORIDE 20 MG/ML
2.5-5 INJECTION, SOLUTION INTRAMUSCULAR; INTRAVENOUS; SUBCUTANEOUS EVERY 6 HOURS PRN
Status: DISCONTINUED | OUTPATIENT
Start: 2023-10-10 | End: 2023-10-10 | Stop reason: HOSPADM

## 2023-10-10 RX ORDER — METHYLERGONOVINE MALEATE 0.2 MG/ML
200 INJECTION INTRAVENOUS
Status: DISCONTINUED | OUTPATIENT
Start: 2023-10-10 | End: 2023-10-12 | Stop reason: HOSPADM

## 2023-10-10 RX ORDER — OXYTOCIN/0.9 % SODIUM CHLORIDE 30/500 ML
100-340 PLASTIC BAG, INJECTION (ML) INTRAVENOUS CONTINUOUS PRN
Status: DISCONTINUED | OUTPATIENT
Start: 2023-10-10 | End: 2023-10-10

## 2023-10-10 RX ORDER — SODIUM CHLORIDE, SODIUM LACTATE, POTASSIUM CHLORIDE, CALCIUM CHLORIDE 600; 310; 30; 20 MG/100ML; MG/100ML; MG/100ML; MG/100ML
INJECTION, SOLUTION INTRAVENOUS
Status: COMPLETED
Start: 2023-10-10 | End: 2023-10-10

## 2023-10-10 RX ORDER — METOCLOPRAMIDE HYDROCHLORIDE 5 MG/ML
10 INJECTION INTRAMUSCULAR; INTRAVENOUS EVERY 6 HOURS PRN
Status: DISCONTINUED | OUTPATIENT
Start: 2023-10-10 | End: 2023-10-12 | Stop reason: HOSPADM

## 2023-10-10 RX ORDER — PROCHLORPERAZINE 25 MG
25 SUPPOSITORY, RECTAL RECTAL EVERY 12 HOURS PRN
Status: DISCONTINUED | OUTPATIENT
Start: 2023-10-10 | End: 2023-10-12 | Stop reason: HOSPADM

## 2023-10-10 RX ORDER — ONDANSETRON 2 MG/ML
4 INJECTION INTRAMUSCULAR; INTRAVENOUS EVERY 6 HOURS PRN
Status: DISCONTINUED | OUTPATIENT
Start: 2023-10-10 | End: 2023-10-12 | Stop reason: HOSPADM

## 2023-10-10 RX ORDER — OXYTOCIN/0.9 % SODIUM CHLORIDE 30/500 ML
340 PLASTIC BAG, INJECTION (ML) INTRAVENOUS CONTINUOUS PRN
Status: DISCONTINUED | OUTPATIENT
Start: 2023-10-10 | End: 2023-10-12 | Stop reason: HOSPADM

## 2023-10-10 RX ADMIN — ACETAMINOPHEN 975 MG: 325 TABLET, FILM COATED ORAL at 11:17

## 2023-10-10 RX ADMIN — MORPHINE SULFATE 0.15 MG: 1 INJECTION, SOLUTION EPIDURAL; INTRATHECAL; INTRAVENOUS at 14:15

## 2023-10-10 RX ADMIN — SODIUM CITRATE AND CITRIC ACID MONOHYDRATE 30 ML: 500; 334 SOLUTION ORAL at 14:10

## 2023-10-10 RX ADMIN — SODIUM CHLORIDE, POTASSIUM CHLORIDE, SODIUM LACTATE AND CALCIUM CHLORIDE: 600; 310; 30; 20 INJECTION, SOLUTION INTRAVENOUS at 14:18

## 2023-10-10 RX ADMIN — ACETAMINOPHEN 975 MG: 325 TABLET, FILM COATED ORAL at 19:48

## 2023-10-10 RX ADMIN — KETOROLAC TROMETHAMINE 30 MG: 30 INJECTION, SOLUTION INTRAMUSCULAR; INTRAVENOUS at 23:48

## 2023-10-10 RX ADMIN — BUPIVACAINE 20 ML: 13.3 INJECTION, SUSPENSION, LIPOSOMAL INFILTRATION at 15:50

## 2023-10-10 RX ADMIN — PHENYLEPHRINE HYDROCHLORIDE 100 MCG: 10 INJECTION INTRAVENOUS at 15:18

## 2023-10-10 RX ADMIN — BUPIVACAINE HYDROCHLORIDE 1.8 ML: 7.5 INJECTION, SOLUTION INTRASPINAL at 14:15

## 2023-10-10 RX ADMIN — SODIUM CHLORIDE, POTASSIUM CHLORIDE, SODIUM LACTATE AND CALCIUM CHLORIDE: 600; 310; 30; 20 INJECTION, SOLUTION INTRAVENOUS at 14:32

## 2023-10-10 RX ADMIN — NALBUPHINE HYDROCHLORIDE 2.5 MG: 20 INJECTION, SOLUTION INTRAMUSCULAR; INTRAVENOUS; SUBCUTANEOUS at 17:22

## 2023-10-10 RX ADMIN — PHENYLEPHRINE HYDROCHLORIDE 50 MCG/MIN: 10 INJECTION INTRAVENOUS at 14:29

## 2023-10-10 RX ADMIN — FENTANYL CITRATE 15 MCG: 50 INJECTION, SOLUTION INTRAMUSCULAR; INTRAVENOUS at 14:15

## 2023-10-10 RX ADMIN — GLYCOPYRROLATE 0.2 MG: 0.2 INJECTION, SOLUTION INTRAMUSCULAR; INTRAVENOUS at 14:24

## 2023-10-10 RX ADMIN — METOCLOPRAMIDE HYDROCHLORIDE 10 MG: 5 INJECTION INTRAMUSCULAR; INTRAVENOUS at 20:02

## 2023-10-10 RX ADMIN — SODIUM CHLORIDE, POTASSIUM CHLORIDE, SODIUM LACTATE AND CALCIUM CHLORIDE 500 ML: 600; 310; 30; 20 INJECTION, SOLUTION INTRAVENOUS at 11:13

## 2023-10-10 RX ADMIN — Medication 2 G: at 14:33

## 2023-10-10 RX ADMIN — BUPIVACAINE HYDROCHLORIDE 20 ML: 2.5 INJECTION, SOLUTION EPIDURAL; INFILTRATION; INTRACAUDAL at 15:50

## 2023-10-10 RX ADMIN — SIMETHICONE 80 MG: 80 TABLET, CHEWABLE ORAL at 19:48

## 2023-10-10 RX ADMIN — PHENYLEPHRINE HYDROCHLORIDE 100 MCG: 10 INJECTION INTRAVENOUS at 15:02

## 2023-10-10 RX ADMIN — KETOROLAC TROMETHAMINE 30 MG: 30 INJECTION, SOLUTION INTRAMUSCULAR at 15:30

## 2023-10-10 RX ADMIN — Medication 300 ML/HR: at 14:47

## 2023-10-10 RX ADMIN — SENNOSIDES AND DOCUSATE SODIUM 2 TABLET: 50; 8.6 TABLET ORAL at 19:48

## 2023-10-10 ASSESSMENT — ACTIVITIES OF DAILY LIVING (ADL)
ADLS_ACUITY_SCORE: 20

## 2023-10-10 ASSESSMENT — LIFESTYLE VARIABLES: TOBACCO_USE: 0

## 2023-10-10 NOTE — BRIEF OP NOTE
[facility]   Brief Operative Note     Surgery Date: 10/10/2023    Surgeon:  Priscila Nichols MD     Assistants:  Deepali Ceja MD PGY3    Kaley Vigil, MS4    Pre-op Diagnosis:    - Intrauterine pregnancy at 39w0d  - History of prior  section      Post-op Diagnosis:    - Same   - Liveborn male infant     Procedure:  Repeat low-transverse  section with double layer uterine closure via Pfannenstiel skin incision; Excision of keloid scar     Anesthesia: Spinal w/ Tap block     EBL:  348 mL  IVF:  1500 mL crystalloid  UOP:  200 mL clear urine at the end of the case    Drains: Paige Catheter    Specimens:  Routine cord blood    Complications:  None apparent    Findings:   Minimal rectofascial adhesions. Clear amniotic fluid. Liveborn male infant in vertex presentation. Large uterine window. Recommend repeat  section at 36-37 weeks in future pregnancy. Otherwise normal fallopian tubes, and ovaries.     Disposition:  Transferred in stable condition to BRINA Ceja MD  OB/GYN Resident, PGY-3  10/10/2023 3:41 PM

## 2023-10-10 NOTE — PROGRESS NOTES
Data: Byron Vásquez transferred to 7130 via zoom cart at 1748. Baby transferred via parent's arms.  Action: Receiving unit notified of transfer: Yes. Patient and family notified of room change. Belongings sent to receiving unit. Accompanied by Registered Nurse. Oriented patient to surroundings. Call light within reach. ID bands double-checked with receiving RN.  Response: Patient tolerated transfer and is stable.

## 2023-10-10 NOTE — PLAN OF CARE
Data: Byron Vásquez transferred to room 7130 via zoomcart at 1740. Baby transferred via parent's arms.  Action: Receiving unit notified of transfer: Yes. Patient and family notified of room change. Report given to Priyanka YUEN at 1735. Belongings sent to receiving unit. Accompanied by Registered Nurse. Oriented patient to surroundings. Call light within reach. ID bands double-checked with receiving RN.  Response: Patient tolerated transfer and is stable.

## 2023-10-10 NOTE — PLAN OF CARE
Goal Outcome Evaluation:         Data: VSS and postpartum checks WNL. Patient eating and drinking normally. Patient able to empty bladder independently and up ambulating. Patient performing self care and able to care for infant. Fundus at 1 cm below  U  and firm  without massage.  lochia scant and  no blood clots. Dressing clean,dry and intact.   Action: Patient denied pain at this time. Encouraged patient to breast  feed every 2 - 3 hours and to monitor for cues to feed infant. Patient education done( education record).   Response: Patient participating in infant's care. Positive attachment with infant observed.. Support/ spouse present at bedside and attentive to infant and patient.   Plan: Continue with the plan of cares.

## 2023-10-10 NOTE — DISCHARGE SUMMARY
[facility] Discharge Summary    Byron Vásquez MRN# [id number]   Age: 23 year old YOB: 2000     Date of Admission:  10/10/2023  Date of Discharge::  10/12/23   Admitting Physician:  Priscila Nichols MD  Discharge Physician:  Darlyn Louie MD              Admission Diagnoses:   - Intrauterine pregnancy at 39w0d  - H/o prior  section  - H/o short interval between pregnancies          Discharge Diagnosis:     - Same, now  and delivered   - Acute blood loss anemia   -uterine window          Procedures:     Procedure(s): - Repeat low transverse  section with double layer closure via Pfannenstiel  skin incision, excision of keloid scar  - Spinal w/ Tap block                Medications Prior to Admission:     Medications Prior to Admission   Medication Sig Dispense Refill Last Dose    ferrous sulfate (FEROSUL) 325 (65 Fe) MG tablet Take 1 tablet (325 mg) by mouth daily (with breakfast) 30 tablet 1 10/9/2023    Prenatal Vit-Fe Fumarate-FA (PRENATAL MULTIVITAMIN W/IRON) 27-0.8 MG tablet Take 1 tablet by mouth daily 90 tablet 3 10/9/2023    vitamin C (ASCORBIC ACID) 250 MG tablet Take 1 tablet (250 mg) by mouth daily 30 tablet 1 10/9/2023    cyanocobalamin (VITAMIN B-12) 1000 MCG tablet Take 1 tablet (1,000 mcg) by mouth daily (Patient not taking: Reported on 10/4/2023) 30 tablet 1              Discharge Medications:   -      Review of your medicines        UNREVIEWED medicines. Ask your doctor about these medicines        Dose / Directions   cyanocobalamin 1000 MCG tablet  Commonly known as: VITAMIN B-12  Used for: Anemia during pregnancy in third trimester      Dose: 1,000 mcg  Take 1 tablet (1,000 mcg) by mouth daily  Quantity: 30 tablet  Refills: 1            START taking        Dose / Directions   acetaminophen 325 MG tablet  Commonly known as: TYLENOL      Dose: 650 mg  Take 2 tablets (650 mg) by mouth every 6 hours as needed for mild pain Start after  Delivery.  Quantity: 100 tablet  Refills: 0     ibuprofen 600 MG tablet  Commonly known as: ADVIL/MOTRIN      Dose: 600 mg  Take 1 tablet (600 mg) by mouth every 6 hours as needed for moderate pain Start after delivery  Quantity: 60 tablet  Refills: 0     oxyCODONE 5 MG tablet  Commonly known as: ROXICODONE      Dose: 5 mg  Take 1 tablet (5 mg) by mouth every 6 hours as needed for severe pain or breakthrough pain  Quantity: 3 tablet  Refills: 0     senna-docusate 8.6-50 MG tablet  Commonly known as: SENOKOT-S/PERICOLACE      Dose: 1 tablet  Take 1 tablet by mouth daily Start after delivery.  Quantity: 100 tablet  Refills: 0            CONTINUE these medicines which may have CHANGED, or have new prescriptions. If we are uncertain of the size of tablets/capsules you have at home, strength may be listed as something that might have changed.        Dose / Directions   * ferrous sulfate 325 (65 Fe) MG tablet  Commonly known as: FEROSUL  This may have changed: Another medication with the same name was added. Make sure you understand how and when to take each.  Used for: Anemia during pregnancy in third trimester      Dose: 325 mg  Take 1 tablet (325 mg) by mouth daily (with breakfast)  Quantity: 30 tablet  Refills: 1     * ferrous sulfate 325 (65 Fe) MG tablet  Commonly known as: FEROSUL  This may have changed: You were already taking a medication with the same name, and this prescription was added. Make sure you understand how and when to take each.  Used for: Anemia during pregnancy in third trimester      Dose: 325 mg  Take 1 tablet (325 mg) by mouth every other day  Quantity: 60 tablet  Refills: 0           * This list has 2 medication(s) that are the same as other medications prescribed for you. Read the directions carefully, and ask your doctor or other care provider to review them with you.                CONTINUE these medicines which have NOT CHANGED        Dose / Directions   prenatal multivitamin w/iron 27-0.8  MG tablet  Used for: High-risk pregnancy, second trimester      Dose: 1 tablet  Take 1 tablet by mouth daily  Quantity: 90 tablet  Refills: 3     vitamin C 250 MG tablet  Commonly known as: ASCORBIC ACID  Used for: Anemia during pregnancy in third trimester      Dose: 250 mg  Take 1 tablet (250 mg) by mouth daily  Quantity: 30 tablet  Refills: 1               Where to get your medicines        These medications were sent to [place], [place] - [address]  [address] [place] [address], [facility] [address]      Phone: [phone]   acetaminophen 325 MG tablet  ferrous sulfate 325 (65 Fe) MG tablet  ibuprofen 600 MG tablet  oxyCODONE 5 MG tablet  senna-docusate 8.6-50 MG tablet              Consultations:   - Anesthesiology  - Lactation           Brief Admission History:   Ms. Byron Vásquez is a 23 year old now  who initially presented at 39w0d for repeat scheduled  section. She reports good fetal movement. Denies LOF, vaginal bleeding or contractions. Denies fever, chills, headache, vision changes, SOB, chest pain, palpitations, nausea, emesis, RUQ pain, epigastric pain, dysuria, change in vaginal discharge, LE swelling/tenderness.        Intraoperative course   The procedure was uncomplicated.   mL.  Findings notable for large uterine window (10x10 cm) in the lower uterine segment. See operative report for details.        Postpartum Course   The patient's hospital course was unremarkable.  She recovered as anticipated and experienced no post-operative complications.  On discharge, her pain was well controlled. Vaginal bleeding is similar to peak menstrual flow.  Voiding without difficulty.  Ambulating well and tolerating a normal diet.  No fever or significant wound drainage.  Breastfeeding and bottle feeding.  Infant is stable.  She was discharged on post-partum day #2.    Post-partum hemoglobin:   Hemoglobin   Date Value Ref Range Status   10/11/2023 9.9 (L) 11.7 -  15.7 g/dL Final             Discharge Instructions and Follow-Up:     Discharge diet: Regular   Discharge activity: No lifting greater than 20 lbs, pushing, pulling, or other strenuous activity for 6 weeks. Pelvic rest for 6 weeks including no sexual intercourse, tampons, or douching. No driving until you can slam on the breaks without pain or while on narcotic pain medications.    Discharge follow-up: Follow up with primary OB for routine postpartum visit in 6 weeks   Wound care: Keep incision clean and dry           Discharge Disposition:     Discharged to home      Deepali Ceja MD   OBGYN resident PGY3  10/12/2023 3:07 PM       Physician Attestation   I saw and evaluated this patient prior to discharge.  I discussed the patient with the resident/fellow and agree with plan of care as documented in the note.      I personally reviewed vital signs, medications, and labs.    I personally spent 5 minutes on discharge activities.    Darlyn Louie MD  Date of Service (when I saw the patient): 10/12/23

## 2023-10-10 NOTE — H&P
OB HISTORY AND PHYSICAL    Patient: Byorn Vásquez   MRN#: [id number]  YOB: 2000     HPI: Byron Vásquez is a 23 year old  at 39w0d by LMP who presents today for repeat scheduled  section.    She reports good fetal movement. Denies LOF, vaginal bleeding, or contractions .      She denies fever, chills, headache, vision changes, SOB, chest pain, palpitations, nausea, emesis, RUQ pain, epigastric pain, dysuria, change in vaginal discharge, LE swelling/tenderness.      Pregnancy notable for:   History of c/s x1     No history of asthma or high blood pressure.    Patient has been NPO since midnight     OBGYN History:   -  s/p 1 prior  section     Prenatal Lab Results:  Lab Results   Component Value Date    HCT 34.0 10/09/2023    HGB 11.3 10/09/2023       Patient Active Problem List    Diagnosis Date Noted    Anemia in pregnancy 2023     Priority: Medium    Short interval between pregnancies affecting pregnancy in second trimester, antepartum 2023     Priority: Medium     C/S at [facility] 10/2022      Need for Tdap vaccination 2023     Priority: Medium    History of  section 2023     Priority: Medium    History of thrombocytopenia 10/19/2022     Priority: Medium    History of previous post-term pregnancy 10/19/2022     Priority: Medium    Uterine leiomyoma 10/19/2022     Priority: Medium     Formatting of this note might be different from the original.  1 cm pedunculated fibroid an anterior upper uterus, noted at       Major depressive disorder, single episode 2018     Priority: Medium     Formatting of this note might be different from the original.  Overview Note: MAJOR DEPRESSIVE DISORDER, SINGLE EPISODE #[id number]#    EXT_ID: [id number]         HISTORY  Past Medical History:   Diagnosis Date    Anemia in pregnancy 2023    Varicella        Past Surgical History:   Procedure Laterality Date     SECTION         Family  "History   Problem Relation Age of Onset    Vision Loss Sister     Glaucoma No family hx of     Macular Degeneration No family hx of        Social History     Tobacco Use    Smoking status: Never    Smokeless tobacco: Never   Vaping Use    Vaping Use: Never used   Substance Use Topics    Alcohol use: Never    Drug use: Never       Medications Prior to Admission   Medication Sig Dispense Refill Last Dose    ferrous sulfate (FEROSUL) 325 (65 Fe) MG tablet Take 1 tablet (325 mg) by mouth daily (with breakfast) 30 tablet 1 10/9/2023    Prenatal Vit-Fe Fumarate-FA (PRENATAL MULTIVITAMIN W/IRON) 27-0.8 MG tablet Take 1 tablet by mouth daily 90 tablet 3 10/9/2023    vitamin C (ASCORBIC ACID) 250 MG tablet Take 1 tablet (250 mg) by mouth daily 30 tablet 1 10/9/2023    cyanocobalamin (VITAMIN B-12) 1000 MCG tablet Take 1 tablet (1,000 mcg) by mouth daily (Patient not taking: Reported on 10/4/2023) 30 tablet 1        No Known Allergies     REVIEW OF SYSTEMS:  A 10 point review of systems was completed and was negative other than as noted in the HPI.    PHYSICAL EXAM  Patient Vitals for the past 24 hrs:   BP Temp Temp src Pulse Resp Height Weight   10/10/23 1041 113/69 98.2  F (36.8  C) Oral 75 16 1.651 m (5' 5\") 71.7 kg (158 lb)     Gen: well appearing   CV: RRR  Lungs: non-labored breathing  Abd: Gravid  Ext: trace peripheral extremity edema    Studies: T&S, CBC, RPR    Assessment & Plan: 23 year old  at 39w0d by LMP, here for repeat scheduled  section. .     # Scheduled repeat  Section  Will plan for a pfannenstiel skin incision with low transverse hysterotomy.  - Labs: CBC, T&S, RPR  - Anesthesia: Spinal   - Abx ppx: 2g Ancef   - PPH Meds/Ppx: Standard meds  - Diet: NPO  - Ppx: SCDs  - Consent: Discussed risks and benefits of procedure, including but not limited to bleeding, infection, injury to surrounding organs (vagina, cervix, uterus, ovaries, fallopian tubes, bowel, bladder, ureters, blood " vessels and nerves of the pelvis), injury to infant, and the potential need for another surgery should an intraoperative injury go unrecognized or if patient were to have continued bleeding. Patient had time to ask questions and expressed understanding of procedure and associated risks. Agreed to blood transfusion if necessary. Verbal consent given for hysterectomy in the setting of life threatening hemorrhage. Consent form signed.     # PNC  - Rh +, Rubella immune, GCT wnl, GBS positive  - Other prenatal labs wnl    Deepali Ceja MD  OBGYN Resident, PGY-2  10/10/23 2:17 PM          Physician Attestation   I personally examined and evaluated this patient.  I discussed the patient with the resident/fellow and care team, and agree with the assessment and plan of care as documented in the note.     Key findings: Category 1 tracing. Consent held. Proceed with .     Please see A&P for additional details of medical decision making.    I have personally reviewed the following data over the past 24 hrs:    10.8  \   11.3 (L)   / 147 (L)     N/A N/A N/A /  N/A   N/A N/A N/A \         Priscila Nichols MD  Date of Service (when I saw the patient): 10/10/23

## 2023-10-10 NOTE — PROVIDER NOTIFICATION
10/10/23 1139   Provider Notification   Provider Name/Title Dr King   Method of Notification Electronic Page   Request Evaluate in Person   Notification Reason Patient Arrived     Patient here for 1230 CS. , 39w0d. Repeat CS for short interval between pregnancies.

## 2023-10-10 NOTE — ANESTHESIA PROCEDURE NOTES
TAP Procedure Note    Pre-Procedure   Staff -        Anesthesiologist:  Babita Alexis MD       Performed By: anesthesiologist       Location: pre-op       Procedure Start/Stop Times: 10/10/2023 3:50 PM       Pre-Anesthestic Checklist: patient identified, IV checked, site marked, risks and benefits discussed, informed consent, monitors and equipment checked, pre-op evaluation, at physician/surgeon's request and post-op pain management  Timeout:       Correct Patient: Yes        Correct Procedure: Yes        Correct Site: Yes        Correct Position: Yes        Correct Laterality: Yes        Site Marked: Yes  Procedure Documentation  Procedure: TAP       Diagnosis: POST OPERATIVE PAIN       Laterality: bilateral       Patient Position: supine       Patient Prep/Sterile Barriers: sterile gloves, mask       Skin prep: Chloraprep       Needle Type: short bevel       Needle Gauge: 21.        Needle Length (millimeters): 110        Ultrasound guided       1. Ultrasound was used to identify targeted nerve, plexus, vascular marker, or fascial plane and place a needle adjacent to it in real-time.       2. Ultrasound was used to visualize the spread of anesthetic in close proximity to the above referenced structure.       3. A permanent image is entered into the patient's record.    Assessment/Narrative         The placement was negative for: blood aspirated, painful injection and site bleeding       Paresthesias: No.       Bolus given via needle..        Secured via.        Insertion/Infusion Method: Single Shot       Complications: none       Injection made incrementally with aspirations every 5 mL.    Medication(s) Administered   Bupivacaine 0.25% PF (Infiltration) - Infiltration   20 mL - 10/10/2023 3:50:00 PM  Bupivacaine liposome (Exparel) 1.3% LA inj susp (Infiltration) - Infiltration   20 mL - 10/10/2023 3:50:00 PM    FOR [facility] ([facility]) ONLY:   Pain Team Contact information: please page the Pain Team  "Via [employer]. Search \"Pain\". During daytime hours, please page the attending first. At night please page the resident first.      "

## 2023-10-10 NOTE — PLAN OF CARE
Patient had viable male at 1446 via scheduled  section. QBL was 348mL. Patient received TAP block. Patient is stable and recovering well in PACU.

## 2023-10-10 NOTE — PROGRESS NOTES
Patient arrived to [facility] unit via zoom cart at 1740  ,with belongings, accompanied by spouse/ significant other, with infant in arms. Received report from  Kathryn YUEN   and checked bands. Unit and room orientation completd. Call light given; no concerns present at this time. Continue with plan of care.

## 2023-10-10 NOTE — ANESTHESIA POSTPROCEDURE EVALUATION
Patient: Byron Vásquez    Procedure: Procedure(s):   SECTION       Anesthesia Type:  Spinal    Note:  Disposition: Admission   Postop Pain Control: Uneventful            Sign Out: Well controlled pain   PONV: No   Neuro/Psych: Uneventful            Sign Out: Acceptable/Baseline neuro status   Airway/Respiratory: Uneventful            Sign Out: Acceptable/Baseline resp. status   CV/Hemodynamics: Uneventful            Sign Out: Acceptable CV status; No obvious hypovolemia; No obvious fluid overload   Other NRE: NONE   DID A NON-ROUTINE EVENT OCCUR? No           Last vitals:  Vitals Value Taken Time   /78 10/10/23 1700   Temp 36  C (96.8  F) 10/10/23 1555   Pulse 59 10/10/23 1711   Resp 16 10/10/23 1700   SpO2 100 % 10/10/23 1712   Vitals shown include unvalidated device data.    Electronically Signed By: Babita Espinoza MD  October 10, 2023  5:16 PM

## 2023-10-10 NOTE — OP NOTE
[facility]  Full Operative Progress Note     Surgery Date:   10/10/2023     Surgeon:           Priscila Nichols MD      Assistants:       Deepali Ceja MD PGY3                            Kaley Vigil MS4     Pre-op Diagnosis:           - Intrauterine pregnancy at 39w0d  - History of prior  section    - History of short interval between pregnancies     Post-op Diagnosis:         - Same   - Liveborn male infant      Procedure:        Repeat low-transverse  section with double layer uterine closure via Pfannenstiel skin incision; Excision of keloid scar      Anesthesia:      Spinal w/ Tap block      EBL:     348 mL  IVF:       1500 mL crystalloid  UOP:    200 mL clear urine at the end of the case     Drains: Paige Catheter     Specimens:      Routine cord blood     Complications:  None apparent     Findings:   Minimal rectofascial adhesions. Clear amniotic fluid. Liveborn male infant in vertex presentation. Apgars 8 and 9, weight 3040 g. Large uterine window  (10 x10 cm) in the lower uterine segment. Recommend repeat  section at 36-37 weeks in future pregnancy. Otherwise normal fallopian tubes, and ovaries.     Procedure Details:   The patient was brought to the OR, where adequate spinal anesthesia was administered.  She was placed in the dorsal supine position with a slight leftward tilt. She was prepped and draped in the usual sterile fashion. A surgical time out was performed. A pfannenstiel skin incision was made with the scalpel and previous keloid excised. The incision was carried down to the underlying fascia with sharp and blunt dissection. The fascia was incised in the midline, and the incision was extended laterally with the Antonio scissors. The superior aspect of the fascia was grasped with the Kocher clamps and dissected off of the underlying rectus muscles with blunt and sharp dissection. Attention was then turned to the inferior aspect of the fascia,  which was similarly dissected off of the underlying rectus muscles. The rectus muscles were  in the midline, and the peritoneum was entered bluntly, and the opening was extended with digital pressure. The bladder blade was placed. Uterine window was noted as above. A transverse hysterotomy was made with the scalpel in the lower uterine segment, above the uterine window, and the incision was extended with digital pressure. The infant was noted to be in the vertex position, and was delivered atraumatically. The shoulders delivered easily.  The cord was doubly clamped and cut, and the infant was handed off to the awaiting nursery staff. A segment of cord was cut. The placenta was delivered with gentle traction on the umbilical cord and uterine massage.  The hysterotomy was closed with a running locked suture of 0 Vicryl.  A second layer was placed using interrupted figure of 8 suture of 0 Vicryl.  The hysterotomy was noted to be hemostatic. The pericolic gutters were cleared of all clots and debris. The hysterotomy was reexamined and noted to be hemostatic. The fascia and rectus muscles were examined and noted to be hemostatic. The fascia was closed with a running 0 Vicryl suture. The subcutaneous tissue was irrigated and areas of oozing were controlled with electrocautery. The subcutaneous tissue was closed with 3-0 vicryl. The skin was closed with 4-0 Vicryl and covered with a sterile dressing.    All sponge, needle, and instrument counts were correct. The patient tolerated the procedure well, and was transferred to recovery in stable condition. Dr. Nichols was present and scrubbed for the entirety of the procedure.     Deepali Ceja MD   OBGYN resident PGY3    Physician Attestation   I was present for the entire procedure between opening and closing.    Priscila Nichols MD  Date of Service (when I saw the patient): 10/10/23

## 2023-10-10 NOTE — ANESTHESIA CARE TRANSFER NOTE
Patient: Byron Vásquez    Procedure: Procedure(s):   SECTION       Diagnosis: History of  delivery [Z98.891]  Diagnosis Additional Information: No value filed.    Anesthesia Type:   Spinal     Note:    Oropharynx: oropharynx clear of all foreign objects and spontaneously breathing  Level of Consciousness: awake  Oxygen Supplementation: room air    Independent Airway: airway patency satisfactory and stable  Dentition: dentition unchanged  Vital Signs Stable: post-procedure vital signs reviewed and stable  Report to RN Given: handoff report given  Patient transferred to: PACU    Handoff Report: Identifed the Patient, Identified the Reponsible Provider, Reviewed the pertinent medical history, Discussed the surgical course, Reviewed Intra-OP anesthesia mangement and issues during anesthesia, Set expectations for post-procedure period and Allowed opportunity for questions and acknowledgement of understanding      Vitals:  Vitals Value Taken Time   BP     Temp     Pulse     Resp     SpO2         Electronically Signed By: CAMACHO Evans CRNA  October 10, 2023  3:53 PM

## 2023-10-11 ENCOUNTER — ANESTHESIA (OUTPATIENT)
Dept: OBSTETRICS | Facility: CLINIC | Age: 23
End: 2023-10-11
Payer: COMMERCIAL

## 2023-10-11 ENCOUNTER — ANESTHESIA EVENT (OUTPATIENT)
Dept: OBSTETRICS | Facility: CLINIC | Age: 23
End: 2023-10-11
Payer: COMMERCIAL

## 2023-10-11 LAB — HGB BLD-MCNC: 9.9 G/DL (ref 11.7–15.7)

## 2023-10-11 PROCEDURE — 120N000002 HC R&B MED SURG/OB UMMC

## 2023-10-11 PROCEDURE — 250N000011 HC RX IP 250 OP 636: Mod: JZ

## 2023-10-11 PROCEDURE — 250N000013 HC RX MED GY IP 250 OP 250 PS 637

## 2023-10-11 PROCEDURE — 85018 HEMOGLOBIN: CPT

## 2023-10-11 PROCEDURE — 36415 COLL VENOUS BLD VENIPUNCTURE: CPT

## 2023-10-11 RX ORDER — BUPIVACAINE HYDROCHLORIDE 7.5 MG/ML
INJECTION, SOLUTION INTRASPINAL
Status: COMPLETED | OUTPATIENT
Start: 2023-10-10 | End: 2023-10-10

## 2023-10-11 RX ORDER — MORPHINE SULFATE 1 MG/ML
INJECTION, SOLUTION EPIDURAL; INTRATHECAL; INTRAVENOUS
Status: COMPLETED | OUTPATIENT
Start: 2023-10-10 | End: 2023-10-10

## 2023-10-11 RX ORDER — FENTANYL CITRATE 50 UG/ML
INJECTION, SOLUTION INTRAMUSCULAR; INTRAVENOUS
Status: COMPLETED | OUTPATIENT
Start: 2023-10-10 | End: 2023-10-10

## 2023-10-11 RX ADMIN — KETOROLAC TROMETHAMINE 30 MG: 30 INJECTION, SOLUTION INTRAMUSCULAR; INTRAVENOUS at 11:34

## 2023-10-11 RX ADMIN — KETOROLAC TROMETHAMINE 30 MG: 30 INJECTION, SOLUTION INTRAMUSCULAR; INTRAVENOUS at 06:06

## 2023-10-11 RX ADMIN — IBUPROFEN 800 MG: 800 TABLET, FILM COATED ORAL at 22:49

## 2023-10-11 RX ADMIN — ACETAMINOPHEN 975 MG: 325 TABLET, FILM COATED ORAL at 02:24

## 2023-10-11 RX ADMIN — ACETAMINOPHEN 975 MG: 325 TABLET, FILM COATED ORAL at 08:56

## 2023-10-11 RX ADMIN — SENNOSIDES AND DOCUSATE SODIUM 1 TABLET: 50; 8.6 TABLET ORAL at 19:37

## 2023-10-11 RX ADMIN — IBUPROFEN 800 MG: 800 TABLET, FILM COATED ORAL at 17:50

## 2023-10-11 RX ADMIN — SENNOSIDES AND DOCUSATE SODIUM 2 TABLET: 50; 8.6 TABLET ORAL at 08:56

## 2023-10-11 RX ADMIN — ACETAMINOPHEN 975 MG: 325 TABLET, FILM COATED ORAL at 19:37

## 2023-10-11 RX ADMIN — ACETAMINOPHEN 975 MG: 325 TABLET, FILM COATED ORAL at 15:01

## 2023-10-11 ASSESSMENT — ACTIVITIES OF DAILY LIVING (ADL)
ADLS_ACUITY_SCORE: 20

## 2023-10-11 NOTE — PLAN OF CARE
Goal Outcome Evaluation:      Plan of Care Reviewed With: patient    Overall Patient Progress: improvingOverall Patient Progress: improving    Outcome Evaluation: VSS and PP assessment WDL. Pain has been minimal and effectively treated with toradol and tylenol - pt knows Oxy PRN is available at request. Pt was nauseous and vomitted x 1 at beginning of shift but has since improved.Paige removed at 2315 last night. Pt was bladder scanned d/t c/o lower stomach pain. Bladder scan volume was 999 and wanted to try to pee at that time. 1st void was 800 - has one more void to measure. Aunt is at bedside. Continue with POC.      Problem: Postpartum ( Delivery)  Goal: Successful Maternal Role Transition  Outcome: Progressing  Goal: Hemostasis  Outcome: Progressing  Goal: Effective Bowel Elimination  Outcome: Progressing  Goal: Fluid and Electrolyte Balance  Outcome: Progressing  Goal: Absence of Infection Signs and Symptoms  Outcome: Progressing  Goal: Anesthesia/Sedation Recovery  Outcome: Progressing  Goal: Optimal Pain Control and Function  Outcome: Progressing  Intervention: Prevent or Manage Pain  Recent Flowsheet Documentation  Taken 10/11/2023 0243 by Laura Hernandez RN  Perineal Care:   absorbent brief/pad changed   perineum cleansed  Goal: Nausea and Vomiting Relief  Outcome: Progressing  Goal: Effective Urinary Elimination  Outcome: Met  Goal: Effective Oxygenation and Ventilation  Outcome: Progressing  Intervention: Optimize Oxygenation and Ventilation  Recent Flowsheet Documentation  Taken 10/11/2023 0243 by Laura Hernandez, RN  Head of Bed (HOB) Positioning: HOB at 30-45 degrees

## 2023-10-11 NOTE — PLAN OF CARE
Goal Outcome Evaluation:       Data: VSS and postpartum checks WNL. Patient eating and drinking normally. Patient able to empty bladder independently and up ambulating. Patient performing self care and able to care for infant. Fundus at 1 cm below U  and firm  without massage.  lochia scant and  no blood clots.   Action: Patient taking Tylenol and Toradol .for pain and pain tolerable. Encouraged patient to breast / formula  feed every 2 - 3 hours and to monitor for cues to feed infant. Patient education done( education record).   Response: Patient participating in infant's care. Positive attachment with infant observed with infant. Support/ Aunty present at bedside and attentive to infant and patient.   Plan: Continue with the plan of cares.

## 2023-10-11 NOTE — PROGRESS NOTES
"[facility]   Post-partum Note    Name:  Byron Vásquez  MRN: [id number]    S: Patient is doing well overall.  Pain well controlled- feeling minimal pain this morning. Paige was removed this morning and has voided twice since without issue. Tolerating regular diet without nausea or vomiting.  Ambulating without dizziness.  Lochia minimal.  Breast and bottle feeding.  Would like to discuss contraception at 6 week visit.     O:   Patient Vitals for the past 24 hrs:   BP Temp Temp src Pulse Resp SpO2 Height Weight   10/10/23 2140 111/ 97.5  F (36.4  C) Oral 62 16 100 % -- --   10/10/23 2043 111/ 97.4  F (36.3  C) Oral 60 16 100 % -- --   10/10/23 1944 111/81 -- -- 61 16 100 % -- --   10/10/23 1840 111/87 -- -- 65 16 100 % -- --   10/10/23 1820 103/66 -- -- 66 16 100 % -- --   10/10/23 1800 103/76 -- -- 58 16 100 % -- --   10/10/23 1730 107/54 -- -- 60 -- -- -- --   10/10/23 1710 103/82 -- -- 63 16 -- -- --   10/10/23 1700 121/78 -- -- 66 16 100 % -- --   10/10/23 1640 102/62 -- -- 71 16 100 % -- --   10/10/23 1625 104/70 -- -- 75 16 100 % -- --   10/10/23 1610 103/70 -- -- 62 16 100 % -- --   10/10/23 1555 94/71 96.8  F (36  C) Axillary 68 16 99 % -- --   10/10/23 1041 113/69 98.2  F (36.8  C) Oral 75 16 -- 1.651 m (5' 5\") 71.7 kg (158 lb)     Gen:  Resting comfortably, NAD  CV:  RR, well perfused  Pulm:  Regular work of breathing  Abd:  Soft, appropriately ttp, minimally distended. Incision with clean bandage in place, plans to remove today.   Ext:  non-tender, trace LE edema b/l    I/O last 3 completed shifts:  In: 1604.25 [I.V.:1604.25]  Out: 1073 [Urine:725; Blood:348]    Hgb:   Hemoglobin   Date Value Ref Range Status   10/09/2023 11.3 (L) 11.7 - 15.7 g/dL Final       Assessment/Plan:  Byron Vásquez 23 year old  on POD#1 s/p RLTCS with notable uterine window. Findings previously discussed with patient and again addressed this morning. VSS.Doing well in the early " postoperative period.     #Postpartum management  Pain: Well-controlled with ibuprofen, tylenol, and oxycodone PRN  Hgb: 11.3>> AM Hgb pending.  Rh: Positive   Rubella: Immune   GI: PRN bowel regimen and antiemetics, simethicone PRN.  : Paige removed, s/p void.   Feed: Breast and bottle feeding.   BC: Wants to discuss at 6 week visit.       *Discussed findings of uterine window during  section. Discussed importance of 18 month pregnancy spacing and recommendation for repeat  section at 36-37 weeks in future pregnancy.     Dispo: Anticipate DC POD#2-3.    Deepali Ceja MD   OBGYN resident PGY3  10/11/2023 6:45 AM

## 2023-10-11 NOTE — ANESTHESIA PROCEDURE NOTES
"Intrathecal injection Procedure Note    Pre-Procedure   Staff -        Anesthesiologist:  Babita Alexis MD       Resident/Fellow: Moises Stephens MD       Performed By: resident       Location: OB       Procedure Start/Stop Times: 10/10/2023 2:15 PM       Pre-Anesthestic Checklist: patient identified, IV checked, risks and benefits discussed, informed consent, monitors and equipment checked, pre-op evaluation, at physician/surgeon's request and post-op pain management  Timeout:       Correct Patient: Yes        Correct Procedure: Yes        Correct Site: Yes        Correct Position: Yes   Procedure Documentation  Procedure: intrathecal injection       Diagnosis:        Patient Position: sitting       Patient Prep/Sterile Barriers: sterile gloves, mask       Skin prep: Chloraprep       Insertion Site: L3-4. (midline approach).       Needle Gauge: 25.        Needle Length (Inches): 3.5        Spinal Needle Type: Pencan       Introducer used       Introducer: 20 G       # of attempts: 1 and  # of redirects:  0    Assessment/Narrative         Paresthesias: No.       Sensory Level: T4       CSF fluid: clear.       Opening pressure was cmH2O while  Sitting.      Medication(s) Administered   0.75% Hyperbaric Bupivacaine (Intrathecal) - Intrathecal   1.8 mL - 10/10/2023 2:15:00 PM  Fentanyl PF (Intrathecal) - Intrathecal   15 mcg - 10/10/2023 2:15:00 PM  Morphine PF 1 mg/mL (Intrathecal) - Intrathecal   0.15 mg - 10/10/2023 2:15:00 PM  Medication Administration Time: 10/10/2023 2:15 PM      FOR [facility] ([facility]) ONLY:   Pain Team Contact information: please page the Pain Team Via [employer]. Search \"Pain\". During daytime hours, please page the attending first. At night please page the resident first.      "

## 2023-10-11 NOTE — PROVIDER NOTIFICATION
10/10/23 2047   Provider Notification   Provider Name/Title G2 Alysha   Method of Notification Electronic Page   Request Evaluate-Remote   Notification Reason Status Update     Good evening. Pt has low UOP and is nauseous so I just gave reglan. So far so good. Do you recommend keeping kerns in until she is able to keep things down and drink more water? Let me know your thoughts. Thanks.     Alysha: OK to keep kerns in until can drink more fluids. Re-assess in an hr or two.

## 2023-10-12 ENCOUNTER — TELEPHONE (OUTPATIENT)
Dept: OBGYN | Facility: CLINIC | Age: 23
End: 2023-10-12

## 2023-10-12 VITALS
DIASTOLIC BLOOD PRESSURE: 74 MMHG | HEIGHT: 65 IN | SYSTOLIC BLOOD PRESSURE: 111 MMHG | TEMPERATURE: 98.6 F | WEIGHT: 163.58 LBS | RESPIRATION RATE: 16 BRPM | HEART RATE: 94 BPM | BODY MASS INDEX: 27.25 KG/M2 | OXYGEN SATURATION: 100 %

## 2023-10-12 PROCEDURE — 250N000013 HC RX MED GY IP 250 OP 250 PS 637

## 2023-10-12 RX ORDER — OXYCODONE HYDROCHLORIDE 5 MG/1
5 TABLET ORAL EVERY 6 HOURS PRN
Qty: 3 TABLET | Refills: 0 | Status: SHIPPED | OUTPATIENT
Start: 2023-10-12

## 2023-10-12 RX ORDER — AMOXICILLIN 250 MG
1 CAPSULE ORAL DAILY
Qty: 100 TABLET | Refills: 0 | Status: SHIPPED | OUTPATIENT
Start: 2023-10-12

## 2023-10-12 RX ORDER — ACETAMINOPHEN 325 MG/1
650 TABLET ORAL EVERY 6 HOURS PRN
Qty: 100 TABLET | Refills: 0 | Status: SHIPPED | OUTPATIENT
Start: 2023-10-12

## 2023-10-12 RX ORDER — IBUPROFEN 600 MG/1
600 TABLET, FILM COATED ORAL EVERY 6 HOURS PRN
Qty: 60 TABLET | Refills: 0 | Status: SHIPPED | OUTPATIENT
Start: 2023-10-12

## 2023-10-12 RX ORDER — FERROUS SULFATE 325(65) MG
325 TABLET ORAL EVERY OTHER DAY
Qty: 60 TABLET | Refills: 0 | Status: SHIPPED | OUTPATIENT
Start: 2023-10-12

## 2023-10-12 RX ADMIN — ACETAMINOPHEN 975 MG: 325 TABLET, FILM COATED ORAL at 02:47

## 2023-10-12 RX ADMIN — ACETAMINOPHEN 975 MG: 325 TABLET, FILM COATED ORAL at 08:55

## 2023-10-12 RX ADMIN — IBUPROFEN 800 MG: 800 TABLET, FILM COATED ORAL at 04:57

## 2023-10-12 RX ADMIN — SENNOSIDES AND DOCUSATE SODIUM 2 TABLET: 50; 8.6 TABLET ORAL at 08:06

## 2023-10-12 RX ADMIN — IBUPROFEN 800 MG: 800 TABLET, FILM COATED ORAL at 11:30

## 2023-10-12 ASSESSMENT — ACTIVITIES OF DAILY LIVING (ADL)
ADLS_ACUITY_SCORE: 20

## 2023-10-12 NOTE — PROGRESS NOTES
"Post  Anesthesia Follow Up Note    Patient: Byron Vásquez    Patient location: Postpartum floor.      Procedure(s) Performed:  Procedure(s):   SECTION    Anesthesia type: Spinal Block    Subjective:     Pain is well controlled     Additional ROS:  She does not complain of pruritis at this time. She denies weakness, denies paresthesia, denies difficulties breathing or voiding, denies nausea or vomiting. She is able to ambulate and tolerates regular diet.    Objective:  Vitals stable      Last Vitals: /74 (BP Location: Left arm, Cuff Size: Adult Regular)   Pulse 94   Temp 37  C (98.6  F) (Oral)   Resp 16   Ht 1.651 m (5' 5\")   Wt 71.7 kg (158 lb)   LMP 01/10/2023   SpO2 100%   Breastfeeding Unknown   BMI 26.29 kg/m      Assessment and plan:   Byron Vásquez is a 23 year old female  POD #2 s/p   and single shot TAP nerve block injections. There is no evidence of adverse side effects associated with spinal and nerve block injections.    Pain is well controlled.    Thank you for including us in the care for this patient.    Fabian Rider MD   CA-2  Anesthesia       "

## 2023-10-12 NOTE — TELEPHONE ENCOUNTER
LVM to set up 6 week PP - DOD 10/10   ----- Message from Darlyn Louie MD sent at 10/12/2023  8:35 AM CDT -----  Regarding: pp follow up  Please call patient and schedule her for pp 6 week visit.  Preferred provider is Dr. Nichols

## 2023-10-12 NOTE — DISCHARGE INSTRUCTIONS
Warning Signs after Having a Baby    Keep this paper on your fridge or somewhere else where you can see it.    Call your provider if you have any of these symptoms up to 12 weeks after having your baby.    Thoughts of hurting yourself or your baby  Pain in your chest or trouble breathing  Severe headache not helped by pain medicine  Eyesight concerns (blurry vision, seeing spots or flashes of light, other changes to eyesight)  Fainting, shaking or other signs of a seizure    Call 9-1-1 if you feel that it is an emergency.     The symptoms below can happen to anyone after giving birth. They can be very serious. Call your provider if you have any of these warning signs.    My provider s phone number: _______________________    Losing too much blood (hemorrhage)    Call your provider if you soak through a pad in less than an hour or pass blood clots bigger than a golf ball. These may be signs that you are bleeding too much.    Blood clots in the legs or lungs    After you give birth, your body naturally clots its blood to help prevent blood loss. Sometimes this increased clotting can happen in other areas of the body, like the legs or lungs. This can block your blood flow and be very dangerous.     Call your provider if you:  Have a red, swollen spot on the back of your leg that is warm or painful when you touch it.   Are coughing up blood.     Infection    Call your provider if you have any of these symptoms:  Fever of 100.4 F (38 C) or higher.  Pain or redness around your stitches if you had an incision.   Any yellow, white, or green fluid coming from places where you had stitches or surgery.    Mood Problems (postpartum depression)    Many people feel sad or have mood changes after having a baby. But for some people, these mood swings are worse.     Call your provider right away if you feel so anxious or nervous that you can't care for yourself or your baby.    Preeclampsia (high blood pressure)    Even if you  didn't have high blood pressure when you were pregnant, you are at risk for the high blood pressure disease called preeclampsia. This risk can last up to 12 weeks after giving birth.     Call your provider if you have:   Pain on your right side under your rib cage  Sudden swelling in the hands and face    Remember: You know your body. If something doesn't feel right, get medical help.     For informational purposes only. Not to replace the advice of your health care provider. Copyright 2020 [facility]. All rights reserved. Clinically reviewed by Casandra Osman, RNC-OB, MSN. [employer] 479563 - Rev .    Postop  Birth Instructions    Activity     Do not lift more than 10 pounds for 6 weeks after surgery.  Ask family and friends for help when you need it.  No driving until you have stopped taking your pain medications (usually two weeks after surgery).  No heavy exercise or activity for 6 weeks.  Don't do anything that will put a strain on your surgery site.  Don't strain when using the toilet.  Your care team may prescribe a stool softener if you have problems with your bowel movements.     To care for your incision:     Keep the incision clean and dry.  Do not soak your incision in water. No swimming or hot tubs until it has fully healed. You may soak in the bathtub if the water level is below your incision.  Do not use peroxide, gel, cream, lotion, or ointment on your incision.  Adjust your clothes to avoid pressure on your surgery site (check the elastic in your underwear for example).     You may see a small amount of clear or pink drainage and this is normal.  Check with your health care provider:     If the drainage increases or has an odor.  If the incision reddens, you have swelling, or develop a rash.  If you have increased pain and the medicine we prescribed doesn't help.  If you have a fever above 100.4 F (38 C) with or without chills when placing thermometer under your tongue.    The area around your incision (surgery wound), will feel numb.  This is normal. The numbness should go away in less than a year.     Keep your hands clean:  Always wash your hands before touching your incision (surgery wound). This helps reduce your risk of infection. If your hands aren't dirty, you may use an alcohol hand-rub to clean your hands. Keep your nails clean and short.    Call your healthcare provider if you have any of these symptoms:     You soak a sanitary pad with blood within 1 hour, or you see blood clots larger than a golf ball.  Bleeding that lasts more than 6 weeks.  Vaginal discharge that smells bad.  Severe pain, cramping or tenderness in your lower belly area.  A need to urinate more frequently (use the toilet more often), more urgently (use the toilet very quickly), or it burns when you urinate.  Nausea and vomiting.  Redness, swelling or pain around a vein in your leg.  Problems breastfeeding or a red or painful area on your breast.  Chest pain and cough or are gasping for air.  Problems with coping with sadness, anxiety or depression. If you have concerns about hurting yourself or the baby, call your provider immediately.    You have questions or concerns after you return home.

## 2023-10-12 NOTE — LACTATION NOTE
This note was copied from a baby's chart.  Initial Consult for:  breastfeeding and supplementing (parental choice)    Delivery Information:  Baby boy was born at Gestational Age: 39w0d via   delivery on 10/10/2023 2:46 PM     Maternal Health History:    Major depressive disorder, single episode  Thrombocytopenia  Uterine Leiomyoma    Infant information: Baby boy has age appropriate output and weight loss.      Weight Change Since Birth: -2% at 1 day old     Feeding History: Baby has been fed by a combination of breastfeeding and HDM by bottle. Supplementation is due to parental request.     Mom has not yet pumped. Byron said she asked for a breast pump last night but was told that the supplies were not available. This evening, 10/11/23, she was then given a pump to use and supplies, and was encouraged to pump each time she gives a supplement.     Feeding Assessment:  Baby was breastfeeding throughout the visit. Mom denied nipple discomfort, but she was encouraged to turn him towards her, tummy-to-tummy, as he came off the breast several times during the visit.    Education:   - Expected  feeding patterns in the first few days (pg. 38 of Your Guide to To Postpartum and [place] Care)/ the Second Night  - Tips to get and maintain a deep latch  - Signs breastfeeding is going well (comfortable latch, audible swallows, age appropriate output and weight loss)    - Pumping recommendations (based on supplementation of  infant)- Inpatient breastfeeding support    Plan: Continue breastfeeding on cue with RN support as needed with a goal of 8-12 feedings per day.     Encourage frequent skin to skin, breast massage and hand expression. Encourage pumping any time a supplement is given.    Visit was cut short by a lab draw and then visitors. Plan to follow-up on 10/12/23, to complete education and check in on pumping and any questions Byron may have.      Shanae Prasad, RN, IBCLC   Lactation  Consultant  Ascom: *[phone]  Office: [phone]

## 2023-10-12 NOTE — PLAN OF CARE
Goal Outcome Evaluation:       Data: VSS and postpartum checks WNL. Patient eating and drinking normally. Patient able to empty bladder independently and up ambulating. Patient performing self care and able to care for infant.Fundus at 1 cm below U and firm  without massage.  lochia scant and  no blood clots. Dressing clean,sry and intact.   Action: Patient taking  Tylenol and Ibuprofen for pain and pain assessed. Encouraged patient to breast  feed every 2 - 3 hours and to monitor for cues to feed infant. Patient education done( education record).   Response: Patient participating in infant's care. Positive attachment with infant observed. Support/ Aunty present at bedside and attentive to infant and patient.   Plan: Continue with the plan of cares.

## 2023-10-12 NOTE — PLAN OF CARE
Goal Outcome Evaluation:       Discharge instructions read and explained to patient, all questions answered. Medications double checked and given to patient. Declined breast pump. Took shower and incision is clean,dry and intact with steri straps intact . No sign of infection noted. EDS zero. . Videos discussed with  patient. . Patient discharged to home with all belongings.

## 2023-10-12 NOTE — PROGRESS NOTES
All VSS. Pt independent in room. Fundal checks all WNL. Pt independent with infant cares. Pt takes scheduled tylenol and motrin. Pt appropriate with bonding.

## 2023-10-12 NOTE — PROGRESS NOTES
[facility]   Post-partum Note    Name:  Byron Vásquez  MRN: [id number]    S: Patient is doing very well overall.  Pain minimal. Voiding without issue. Tolerating regular diet without nausea or vomiting.  Ambulating without dizziness.  Lochia minimal.  Breast and bottle feeding.  Would like to discuss contraception at 6 week visit.     O:   Patient Vitals for the past 24 hrs:   BP Temp Temp src Pulse Resp   10/12/23 0300 99/63 97.9  F (36.6  C) Oral 66 16   10/11/23 1930 105/75 97.4  F (36.3  C) Oral 72 16   10/11/23 1312 125/84 98.1  F (36.7  C) Oral 81 16   10/11/23 0843 102/70 98.2  F (36.8  C) Oral 74 16     Gen:  Resting comfortably, NAD  CV:  RR, well perfused  Pulm:  Regular work of breathing  Abd:  Soft, appropriately ttp, minimally distended. Incision with clean bandage in place, plans to remove today.   Ext:  non-tender, trace LE edema b/l    I/O last 3 completed shifts:  In: -   Out: 2250 [Urine:2250]    Hgb:   Hemoglobin   Date Value Ref Range Status   10/11/2023 9.9 (L) 11.7 - 15.7 g/dL Final       Assessment/Plan:  Byron Vásquez 23 year old  on POD#2 s/p RLTCS with notable uterine window. Findings previously discussed with patient and again addressed yesterday morning. VSS. Doing very well, stable for discharge.     Postpartum management  Pain: S/p Tap block. Well-controlled with ibuprofen, tylenol. Has not required oxycodone for pain.  Hgb: 11.3>> 9.9. Will discharge with oral iron.   Rh: Positive   Rubella: Immune   GI: PRN bowel regimen and antiemetics, simethicone PRN.  : Paige removed, s/p void.   Feed: Breast and bottle feeding.   BC: Wants to discuss at 6 week visit.       *Discussed findings of uterine window during  section. Discussed importance of 18 month pregnancy spacing and recommendation for repeat  section at 36-37 weeks in future pregnancy.     Dispo: Discharge today.     Deepali Ceja MD   OBGYN resident  PGY3  10/12/2023 6:53 AM       Physician Attestation   I personally examined and evaluated this patient.  I discussed the patient with the resident/fellow and care team, and agree with the assessment and plan of care as documented in the note.     Key findings: States pain is well controlled. Discussed continued scheduled tylenol and ibuprofen.  Reviewed option for oxycodone for breakthrough pain and patient would like 3 tabs for home.  Reviewed post op activity restrictions.  Explained normal vs. Abnormal pp bleeding.  Reviewed s/sx of pp depression.  Recommend 6 week postpartum visit.  Message sent to clinic to coordinate.     Darlyn Louie MD  Date of Service (when I saw the patient): 10/12/23

## 2023-10-25 ENCOUNTER — PRENATAL OFFICE VISIT (OUTPATIENT)
Dept: OBGYN | Facility: CLINIC | Age: 23
End: 2023-10-25
Payer: COMMERCIAL

## 2023-10-25 VITALS
HEART RATE: 74 BPM | HEIGHT: 65 IN | SYSTOLIC BLOOD PRESSURE: 105 MMHG | BODY MASS INDEX: 27.16 KG/M2 | WEIGHT: 163 LBS | DIASTOLIC BLOOD PRESSURE: 69 MMHG | TEMPERATURE: 97.9 F

## 2023-10-25 DIAGNOSIS — Z98.891 S/P CESAREAN SECTION: Primary | ICD-10-CM

## 2023-10-25 PROCEDURE — 99024 POSTOP FOLLOW-UP VISIT: CPT | Performed by: OBSTETRICS & GYNECOLOGY

## 2023-10-25 RX ORDER — PRENATAL VIT/IRON FUM/FOLIC AC 27MG-0.8MG
1 TABLET ORAL DAILY
Qty: 90 TABLET | Refills: 3 | Status: SHIPPED | OUTPATIENT
Start: 2023-10-25

## 2023-10-25 ASSESSMENT — PATIENT HEALTH QUESTIONNAIRE - PHQ9
SUM OF ALL RESPONSES TO PHQ QUESTIONS 1-9: 2
5. POOR APPETITE OR OVEREATING: NOT AT ALL

## 2023-10-25 ASSESSMENT — ANXIETY QUESTIONNAIRES
1. FEELING NERVOUS, ANXIOUS, OR ON EDGE: NOT AT ALL
IF YOU CHECKED OFF ANY PROBLEMS ON THIS QUESTIONNAIRE, HOW DIFFICULT HAVE THESE PROBLEMS MADE IT FOR YOU TO DO YOUR WORK, TAKE CARE OF THINGS AT HOME, OR GET ALONG WITH OTHER PEOPLE: NOT DIFFICULT AT ALL
3. WORRYING TOO MUCH ABOUT DIFFERENT THINGS: NOT AT ALL
6. BECOMING EASILY ANNOYED OR IRRITABLE: NOT AT ALL
2. NOT BEING ABLE TO STOP OR CONTROL WORRYING: NOT AT ALL
7. FEELING AFRAID AS IF SOMETHING AWFUL MIGHT HAPPEN: NOT AT ALL
GAD7 TOTAL SCORE: 0
GAD7 TOTAL SCORE: 0
5. BEING SO RESTLESS THAT IT IS HARD TO SIT STILL: NOT AT ALL

## 2023-10-25 NOTE — PROGRESS NOTES
"S: Byron Vásquez is a 23 year old female who presents for post operative check. She is 2 weeks status post repeat .  She presented for repeat due to short interval pregnancy. She reports doing well and denies significant pain or bleeding. She is breastfeeding and that si going well.  Mood is tired but stable.  She has no more pain and feels this recovery is going better than first.     O.  /69   Pulse 74   Temp 97.9  F (36.6  C)   Ht 1.651 m (5' 5\")   Wt 73.9 kg (163 lb)   LMP 01/10/2023   Breastfeeding Yes   BMI 27.12 kg/m       Abd: soft, non-tender, non-distended.   Incision: clear, dry, and intact without evidence of infection  Ex:  no calf tend    A. S/p repeat          Doing well    P. Cont activity restrictions.  RTC for 6 week pp check    MATTHIAS VERGARA MD    "

## 2023-11-21 ENCOUNTER — PRENATAL OFFICE VISIT (OUTPATIENT)
Dept: OBGYN | Facility: CLINIC | Age: 23
End: 2023-11-21
Payer: COMMERCIAL

## 2023-11-21 VITALS
WEIGHT: 158 LBS | BODY MASS INDEX: 26.29 KG/M2 | DIASTOLIC BLOOD PRESSURE: 62 MMHG | RESPIRATION RATE: 16 BRPM | TEMPERATURE: 98 F | SYSTOLIC BLOOD PRESSURE: 90 MMHG | HEART RATE: 75 BPM

## 2023-11-21 DIAGNOSIS — Z12.4 PAP SMEAR FOR CERVICAL CANCER SCREENING: Primary | ICD-10-CM

## 2023-11-21 PROBLEM — Z98.891 S/P CESAREAN SECTION: Status: RESOLVED | Noted: 2023-10-10 | Resolved: 2023-11-21

## 2023-11-21 PROBLEM — Z23 NEED FOR TDAP VACCINATION: Status: RESOLVED | Noted: 2023-04-04 | Resolved: 2023-11-21

## 2023-11-21 PROBLEM — O09.892 SHORT INTERVAL BETWEEN PREGNANCIES AFFECTING PREGNANCY IN SECOND TRIMESTER, ANTEPARTUM: Status: RESOLVED | Noted: 2023-05-09 | Resolved: 2023-11-21

## 2023-11-21 PROBLEM — Z87.59: Status: RESOLVED | Noted: 2022-10-19 | Resolved: 2023-11-21

## 2023-11-21 PROCEDURE — 99207 PR POST PARTUM EXAM: CPT | Performed by: OBSTETRICS & GYNECOLOGY

## 2023-11-21 PROCEDURE — G0145 SCR C/V CYTO,THINLAYER,RESCR: HCPCS | Performed by: OBSTETRICS & GYNECOLOGY

## 2023-11-21 ASSESSMENT — ANXIETY QUESTIONNAIRES
3. WORRYING TOO MUCH ABOUT DIFFERENT THINGS: NOT AT ALL
7. FEELING AFRAID AS IF SOMETHING AWFUL MIGHT HAPPEN: NOT AT ALL
IF YOU CHECKED OFF ANY PROBLEMS ON THIS QUESTIONNAIRE, HOW DIFFICULT HAVE THESE PROBLEMS MADE IT FOR YOU TO DO YOUR WORK, TAKE CARE OF THINGS AT HOME, OR GET ALONG WITH OTHER PEOPLE: SOMEWHAT DIFFICULT
1. FEELING NERVOUS, ANXIOUS, OR ON EDGE: SEVERAL DAYS
GAD7 TOTAL SCORE: 2
GAD7 TOTAL SCORE: 2
2. NOT BEING ABLE TO STOP OR CONTROL WORRYING: SEVERAL DAYS
5. BEING SO RESTLESS THAT IT IS HARD TO SIT STILL: NOT AT ALL
6. BECOMING EASILY ANNOYED OR IRRITABLE: NOT AT ALL

## 2023-11-21 ASSESSMENT — PATIENT HEALTH QUESTIONNAIRE - PHQ9
5. POOR APPETITE OR OVEREATING: NOT AT ALL
SUM OF ALL RESPONSES TO PHQ QUESTIONS 1-9: 0

## 2023-11-21 NOTE — PROGRESS NOTES
Byron is here for a 6-week postpartum checkup.    She had a repeat c/s of a viable boy, weight 6 pounds 11 oz., with no complications.  Since delivery, she has been breast feeding.  She has No signs of infection, bleeding or other complications.  She is not pregnant.  We discussed contraceptions and she declines.  Her  left about 2 weeks before her delivery and has not been in contact with her, she does not believe he is coming back.  She had a tough time emotionally several weeks ago just coming to the realization he was gone and she was alone, but is doing much better now.  She denies feeling down or sad.  She is overwhelmed at times with the 2 small children but has neighbors who help her quite a bit.    EXAM:    HEENT: grossly normal.  NECK: no lymphadenopathy or thyroidomegaly.  LUNGS: CTA X 2, no rales or crackles.  BACK: No spinal or CVA tenderness.  HEART: RRR without murmurs clicks or gallops.  ABDOMEN: soft, non tender, good bowel sounds, without masses rebound, guarding or tenderness. Incision well healed    PELVIC:    External genitalia: normal without lesion, repair well healed.                            Vagina: normal mucosa and rugae, no discharge.  Cervix: multiparous, well healed, without lesion.  Uterus: non pregnant in size, firm , mobile, no lesions.  Adnexa: non tender, without masses    EXTREMITIES: Warm to touch, good pulses, no ankle edema or calf tenderness.  NEUROLOGIC: grossly normal.    ASSESSMENT:   Normal 6-week postpartum exam after repeat c/s.    PLAN:  Pap smear done and nothing for contraception.  Ok to resume normal activities.  We discussed that she is certainly at an increased risk for pp depression, so monitoring for symptoms and if she feels they are returning/worsening, call and we can help her.  She verbalizes understanding.  RTC yearly or prn.    MATTHIAS VERGARA MD

## 2023-11-28 LAB
BKR LAB AP GYN ADEQUACY: NORMAL
BKR LAB AP GYN INTERPRETATION: NORMAL
BKR LAB AP HPV REFLEX: NORMAL
BKR LAB AP PREVIOUS ABNORMAL: NORMAL
PATH REPORT.COMMENTS IMP SPEC: NORMAL
PATH REPORT.COMMENTS IMP SPEC: NORMAL
PATH REPORT.RELEVANT HX SPEC: NORMAL

## 2024-02-24 ENCOUNTER — HEALTH MAINTENANCE LETTER (OUTPATIENT)
Age: 24
End: 2024-02-24

## 2025-03-09 ENCOUNTER — HEALTH MAINTENANCE LETTER (OUTPATIENT)
Age: 25
End: 2025-03-09

## (undated) DEVICE — BNDG ABDOMINAL BINDER 10X26-50" 08140145

## (undated) DEVICE — SOL ADH LIQUID BENZOIN SWAB 0.6ML C1544

## (undated) DEVICE — GLOVE PROTEXIS BLUE W/NEU-THERA 7.0  2D73EB70

## (undated) DEVICE — GLOVE ESTEEM POWDER FREE SMT 6.5  2D72PT65

## (undated) DEVICE — DRSG STERI STRIP 1/4X3" R1541

## (undated) DEVICE — PACK C-SECTION LF PL15OTA83B

## (undated) DEVICE — PREP CHLORAPREP 26ML TINTED ORANGE  260815

## (undated) DEVICE — STRAP KNEE/BODY 31143004

## (undated) DEVICE — ESU GROUND PAD UNIVERSAL W/O CORD

## (undated) DEVICE — SOL NACL 0.9% IRRIG 1000ML BOTTLE 07138-09

## (undated) DEVICE — STOCKING SLEEVE COMPRESSION CALF LG

## (undated) DEVICE — SOL WATER IRRIG 1000ML BOTTLE 07139-09

## (undated) DEVICE — SU VICRYL 3-0 CTX 36" UND J980H

## (undated) DEVICE — SU VICRYL 4-0 KS 27" UND J662H

## (undated) DEVICE — CATH TRAY FOLEY 16FR BARDEX W/DRAIN BAG STATLOCK 300316A

## (undated) DEVICE — DRSG ABDOMINAL 07 1/2X8" 7197D

## (undated) DEVICE — SU VICRYL 0 CT-1 36" J346H

## (undated) RX ORDER — FENTANYL CITRATE 50 UG/ML
INJECTION, SOLUTION INTRAMUSCULAR; INTRAVENOUS
Status: DISPENSED
Start: 2023-10-10

## (undated) RX ORDER — CEFAZOLIN SODIUM/WATER 2 G/20 ML
SYRINGE (ML) INTRAVENOUS
Status: DISPENSED
Start: 2023-10-10

## (undated) RX ORDER — KETOROLAC TROMETHAMINE 30 MG/ML
INJECTION, SOLUTION INTRAMUSCULAR; INTRAVENOUS
Status: DISPENSED
Start: 2023-10-10

## (undated) RX ORDER — GLYCOPYRROLATE 0.2 MG/ML
INJECTION, SOLUTION INTRAMUSCULAR; INTRAVENOUS
Status: DISPENSED
Start: 2023-10-10

## (undated) RX ORDER — MORPHINE SULFATE 1 MG/ML
INJECTION, SOLUTION EPIDURAL; INTRATHECAL; INTRAVENOUS
Status: DISPENSED
Start: 2023-10-10

## (undated) RX ORDER — OXYTOCIN/0.9 % SODIUM CHLORIDE 30/500 ML
PLASTIC BAG, INJECTION (ML) INTRAVENOUS
Status: DISPENSED
Start: 2023-10-10

## (undated) RX ORDER — FENTANYL CITRATE-0.9 % NACL/PF 10 MCG/ML
PLASTIC BAG, INJECTION (ML) INTRAVENOUS
Status: DISPENSED
Start: 2023-10-10

## (undated) RX ORDER — PHENYLEPHRINE HCL IN 0.9% NACL 50MG/250ML
PLASTIC BAG, INJECTION (ML) INTRAVENOUS
Status: DISPENSED
Start: 2023-10-10

## (undated) RX ORDER — BUPIVACAINE HYDROCHLORIDE 2.5 MG/ML
INJECTION, SOLUTION EPIDURAL; INFILTRATION; INTRACAUDAL
Status: DISPENSED
Start: 2023-10-10